# Patient Record
Sex: FEMALE | Race: WHITE | NOT HISPANIC OR LATINO | Employment: OTHER | URBAN - METROPOLITAN AREA
[De-identification: names, ages, dates, MRNs, and addresses within clinical notes are randomized per-mention and may not be internally consistent; named-entity substitution may affect disease eponyms.]

---

## 2024-11-21 ENCOUNTER — APPOINTMENT (OUTPATIENT)
Dept: CARDIOLOGY | Facility: HOSPITAL | Age: 69
End: 2024-11-21
Payer: MEDICARE

## 2024-11-21 ENCOUNTER — APPOINTMENT (OUTPATIENT)
Dept: RADIOLOGY | Facility: HOSPITAL | Age: 69
End: 2024-11-21
Payer: MEDICARE

## 2024-11-21 ENCOUNTER — HOSPITAL ENCOUNTER (INPATIENT)
Facility: HOSPITAL | Age: 69
End: 2024-11-21
Attending: EMERGENCY MEDICINE | Admitting: STUDENT IN AN ORGANIZED HEALTH CARE EDUCATION/TRAINING PROGRAM
Payer: MEDICARE

## 2024-11-21 DIAGNOSIS — I27.20 PULMONARY HYPERTENSION (MULTI): ICD-10-CM

## 2024-11-21 DIAGNOSIS — I51.81 TAKOTSUBO SYNDROME: ICD-10-CM

## 2024-11-21 DIAGNOSIS — L03.115 BILATERAL LOWER LEG CELLULITIS: ICD-10-CM

## 2024-11-21 DIAGNOSIS — R79.89 ELEVATED TROPONIN: ICD-10-CM

## 2024-11-21 DIAGNOSIS — L03.116 BILATERAL LOWER LEG CELLULITIS: ICD-10-CM

## 2024-11-21 DIAGNOSIS — R60.0 BILATERAL LOWER EXTREMITY EDEMA: ICD-10-CM

## 2024-11-21 DIAGNOSIS — I21.4 NON-ST ELEVATION (NSTEMI) MYOCARDIAL INFARCTION (MULTI): ICD-10-CM

## 2024-11-21 DIAGNOSIS — I50.9 NEW ONSET OF CONGESTIVE HEART FAILURE: Primary | ICD-10-CM

## 2024-11-21 DIAGNOSIS — I25.10 CAD (CORONARY ARTERY DISEASE): ICD-10-CM

## 2024-11-21 LAB
ALBUMIN SERPL BCP-MCNC: 3.4 G/DL (ref 3.4–5)
ALP SERPL-CCNC: 51 U/L (ref 33–136)
ALT SERPL W P-5'-P-CCNC: 10 U/L (ref 7–45)
ANION GAP SERPL CALC-SCNC: 9 MMOL/L (ref 10–20)
AST SERPL W P-5'-P-CCNC: 11 U/L (ref 9–39)
BASOPHILS # BLD AUTO: 0.05 X10*3/UL (ref 0–0.1)
BASOPHILS NFR BLD AUTO: 1 %
BILIRUB SERPL-MCNC: 0.6 MG/DL (ref 0–1.2)
BNP SERPL-MCNC: 1146 PG/ML (ref 0–99)
BUN SERPL-MCNC: 16 MG/DL (ref 6–23)
CALCIUM SERPL-MCNC: 9 MG/DL (ref 8.6–10.3)
CARDIAC TROPONIN I PNL SERPL HS: 415 NG/L (ref 0–13)
CARDIAC TROPONIN I PNL SERPL HS: 64 NG/L (ref 0–13)
CHLORIDE SERPL-SCNC: 100 MMOL/L (ref 98–107)
CO2 SERPL-SCNC: 35 MMOL/L (ref 21–32)
CREAT SERPL-MCNC: 0.91 MG/DL (ref 0.5–1.05)
EGFRCR SERPLBLD CKD-EPI 2021: 68 ML/MIN/1.73M*2
EOSINOPHIL # BLD AUTO: 0.05 X10*3/UL (ref 0–0.7)
EOSINOPHIL NFR BLD AUTO: 1 %
ERYTHROCYTE [DISTWIDTH] IN BLOOD BY AUTOMATED COUNT: 14 % (ref 11.5–14.5)
GLUCOSE SERPL-MCNC: 101 MG/DL (ref 74–99)
HCT VFR BLD AUTO: 49.3 % (ref 36–46)
HGB BLD-MCNC: 16 G/DL (ref 12–16)
IMM GRANULOCYTES # BLD AUTO: 0.01 X10*3/UL (ref 0–0.7)
IMM GRANULOCYTES NFR BLD AUTO: 0.2 % (ref 0–0.9)
INR PPP: 1.2 (ref 0.9–1.1)
LYMPHOCYTES # BLD AUTO: 0.79 X10*3/UL (ref 1.2–4.8)
LYMPHOCYTES NFR BLD AUTO: 15.8 %
MAGNESIUM SERPL-MCNC: 1.89 MG/DL (ref 1.6–2.4)
MCH RBC QN AUTO: 30.7 PG (ref 26–34)
MCHC RBC AUTO-ENTMCNC: 32.5 G/DL (ref 32–36)
MCV RBC AUTO: 94 FL (ref 80–100)
MONOCYTES # BLD AUTO: 0.46 X10*3/UL (ref 0.1–1)
MONOCYTES NFR BLD AUTO: 9.2 %
NEUTROPHILS # BLD AUTO: 3.65 X10*3/UL (ref 1.2–7.7)
NEUTROPHILS NFR BLD AUTO: 72.8 %
NRBC BLD-RTO: 0 /100 WBCS (ref 0–0)
PLATELET # BLD AUTO: 186 X10*3/UL (ref 150–450)
POTASSIUM SERPL-SCNC: 4.8 MMOL/L (ref 3.5–5.3)
PROT SERPL-MCNC: 6.4 G/DL (ref 6.4–8.2)
PROTHROMBIN TIME: 13.4 SECONDS (ref 9.8–12.8)
RBC # BLD AUTO: 5.22 X10*6/UL (ref 4–5.2)
SODIUM SERPL-SCNC: 139 MMOL/L (ref 136–145)
WBC # BLD AUTO: 5 X10*3/UL (ref 4.4–11.3)

## 2024-11-21 PROCEDURE — 2500000004 HC RX 250 GENERAL PHARMACY W/ HCPCS (ALT 636 FOR OP/ED): Performed by: NURSE PRACTITIONER

## 2024-11-21 PROCEDURE — 83880 ASSAY OF NATRIURETIC PEPTIDE: CPT | Performed by: NURSE PRACTITIONER

## 2024-11-21 PROCEDURE — 99223 1ST HOSP IP/OBS HIGH 75: CPT | Performed by: STUDENT IN AN ORGANIZED HEALTH CARE EDUCATION/TRAINING PROGRAM

## 2024-11-21 PROCEDURE — 84484 ASSAY OF TROPONIN QUANT: CPT | Performed by: NURSE PRACTITIONER

## 2024-11-21 PROCEDURE — 84075 ASSAY ALKALINE PHOSPHATASE: CPT | Performed by: NURSE PRACTITIONER

## 2024-11-21 PROCEDURE — 71045 X-RAY EXAM CHEST 1 VIEW: CPT | Mod: FOREIGN READ | Performed by: RADIOLOGY

## 2024-11-21 PROCEDURE — 94640 AIRWAY INHALATION TREATMENT: CPT

## 2024-11-21 PROCEDURE — 83735 ASSAY OF MAGNESIUM: CPT | Performed by: NURSE PRACTITIONER

## 2024-11-21 PROCEDURE — 96374 THER/PROPH/DIAG INJ IV PUSH: CPT

## 2024-11-21 PROCEDURE — 99285 EMERGENCY DEPT VISIT HI MDM: CPT

## 2024-11-21 PROCEDURE — 1200000002 HC GENERAL ROOM WITH TELEMETRY DAILY

## 2024-11-21 PROCEDURE — 84484 ASSAY OF TROPONIN QUANT: CPT | Performed by: STUDENT IN AN ORGANIZED HEALTH CARE EDUCATION/TRAINING PROGRAM

## 2024-11-21 PROCEDURE — 85610 PROTHROMBIN TIME: CPT | Performed by: NURSE PRACTITIONER

## 2024-11-21 PROCEDURE — 2500000005 HC RX 250 GENERAL PHARMACY W/O HCPCS: Performed by: EMERGENCY MEDICINE

## 2024-11-21 PROCEDURE — 2500000001 HC RX 250 WO HCPCS SELF ADMINISTERED DRUGS (ALT 637 FOR MEDICARE OP): Performed by: NURSE PRACTITIONER

## 2024-11-21 PROCEDURE — 93005 ELECTROCARDIOGRAM TRACING: CPT

## 2024-11-21 PROCEDURE — 71045 X-RAY EXAM CHEST 1 VIEW: CPT

## 2024-11-21 PROCEDURE — 2500000004 HC RX 250 GENERAL PHARMACY W/ HCPCS (ALT 636 FOR OP/ED)

## 2024-11-21 PROCEDURE — 80202 ASSAY OF VANCOMYCIN: CPT

## 2024-11-21 PROCEDURE — 85025 COMPLETE CBC W/AUTO DIFF WBC: CPT | Performed by: NURSE PRACTITIONER

## 2024-11-21 PROCEDURE — 36415 COLL VENOUS BLD VENIPUNCTURE: CPT | Performed by: STUDENT IN AN ORGANIZED HEALTH CARE EDUCATION/TRAINING PROGRAM

## 2024-11-21 PROCEDURE — 2500000002 HC RX 250 W HCPCS SELF ADMINISTERED DRUGS (ALT 637 FOR MEDICARE OP, ALT 636 FOR OP/ED): Performed by: NURSE PRACTITIONER

## 2024-11-21 PROCEDURE — 36415 COLL VENOUS BLD VENIPUNCTURE: CPT | Performed by: NURSE PRACTITIONER

## 2024-11-21 RX ORDER — ACETAMINOPHEN 325 MG/1
650 TABLET ORAL EVERY 4 HOURS PRN
Status: DISCONTINUED | OUTPATIENT
Start: 2024-11-21 | End: 2024-11-26 | Stop reason: HOSPADM

## 2024-11-21 RX ORDER — NAPROXEN SODIUM 220 MG/1
324 TABLET, FILM COATED ORAL ONCE
Status: COMPLETED | OUTPATIENT
Start: 2024-11-21 | End: 2024-11-21

## 2024-11-21 RX ORDER — IPRATROPIUM BROMIDE AND ALBUTEROL SULFATE 2.5; .5 MG/3ML; MG/3ML
3 SOLUTION RESPIRATORY (INHALATION) ONCE
Status: COMPLETED | OUTPATIENT
Start: 2024-11-21 | End: 2024-11-21

## 2024-11-21 RX ORDER — ENOXAPARIN SODIUM 100 MG/ML
40 INJECTION SUBCUTANEOUS EVERY 24 HOURS
Status: DISCONTINUED | OUTPATIENT
Start: 2024-11-21 | End: 2024-11-26 | Stop reason: HOSPADM

## 2024-11-21 RX ORDER — FUROSEMIDE 10 MG/ML
40 INJECTION INTRAMUSCULAR; INTRAVENOUS
Status: DISCONTINUED | OUTPATIENT
Start: 2024-11-22 | End: 2024-11-23

## 2024-11-21 RX ORDER — ACETAMINOPHEN 650 MG/1
650 SUPPOSITORY RECTAL EVERY 4 HOURS PRN
Status: DISCONTINUED | OUTPATIENT
Start: 2024-11-21 | End: 2024-11-26 | Stop reason: HOSPADM

## 2024-11-21 RX ORDER — VANCOMYCIN HYDROCHLORIDE 1 G/20ML
INJECTION, POWDER, LYOPHILIZED, FOR SOLUTION INTRAVENOUS DAILY PRN
Status: DISCONTINUED | OUTPATIENT
Start: 2024-11-21 | End: 2024-11-25

## 2024-11-21 RX ORDER — ACETAMINOPHEN 160 MG/5ML
650 SOLUTION ORAL EVERY 4 HOURS PRN
Status: DISCONTINUED | OUTPATIENT
Start: 2024-11-21 | End: 2024-11-26 | Stop reason: HOSPADM

## 2024-11-21 RX ORDER — POLYETHYLENE GLYCOL 3350 17 G/17G
17 POWDER, FOR SOLUTION ORAL DAILY PRN
Status: DISCONTINUED | OUTPATIENT
Start: 2024-11-21 | End: 2024-11-26 | Stop reason: HOSPADM

## 2024-11-21 RX ORDER — FUROSEMIDE 10 MG/ML
40 INJECTION INTRAMUSCULAR; INTRAVENOUS ONCE
Status: COMPLETED | OUTPATIENT
Start: 2024-11-21 | End: 2024-11-21

## 2024-11-21 RX ORDER — VANCOMYCIN HYDROCHLORIDE 750 MG/150ML
750 INJECTION, SOLUTION INTRAVENOUS EVERY 12 HOURS
Status: DISCONTINUED | OUTPATIENT
Start: 2024-11-21 | End: 2024-11-22

## 2024-11-21 SDOH — SOCIAL STABILITY: SOCIAL INSECURITY: DO YOU FEEL ANYONE HAS EXPLOITED OR TAKEN ADVANTAGE OF YOU FINANCIALLY OR OF YOUR PERSONAL PROPERTY?: NO

## 2024-11-21 SDOH — SOCIAL STABILITY: SOCIAL INSECURITY: ABUSE: ADULT

## 2024-11-21 SDOH — SOCIAL STABILITY: SOCIAL INSECURITY: ARE YOU OR HAVE YOU BEEN THREATENED OR ABUSED PHYSICALLY, EMOTIONALLY, OR SEXUALLY BY ANYONE?: NO

## 2024-11-21 SDOH — SOCIAL STABILITY: SOCIAL INSECURITY: ARE THERE ANY APPARENT SIGNS OF INJURIES/BEHAVIORS THAT COULD BE RELATED TO ABUSE/NEGLECT?: NO

## 2024-11-21 SDOH — SOCIAL STABILITY: SOCIAL INSECURITY: WERE YOU ABLE TO COMPLETE ALL THE BEHAVIORAL HEALTH SCREENINGS?: YES

## 2024-11-21 SDOH — SOCIAL STABILITY: SOCIAL INSECURITY: DOES ANYONE TRY TO KEEP YOU FROM HAVING/CONTACTING OTHER FRIENDS OR DOING THINGS OUTSIDE YOUR HOME?: NO

## 2024-11-21 SDOH — SOCIAL STABILITY: SOCIAL INSECURITY: HAVE YOU HAD THOUGHTS OF HARMING ANYONE ELSE?: NO

## 2024-11-21 SDOH — SOCIAL STABILITY: SOCIAL INSECURITY: HAVE YOU HAD ANY THOUGHTS OF HARMING ANYONE ELSE?: YES

## 2024-11-21 SDOH — SOCIAL STABILITY: SOCIAL INSECURITY: HAS ANYONE EVER THREATENED TO HURT YOUR FAMILY OR YOUR PETS?: NO

## 2024-11-21 SDOH — SOCIAL STABILITY: SOCIAL INSECURITY: DO YOU FEEL UNSAFE GOING BACK TO THE PLACE WHERE YOU ARE LIVING?: NO

## 2024-11-21 ASSESSMENT — ACTIVITIES OF DAILY LIVING (ADL)
FEEDING YOURSELF: INDEPENDENT
HEARING - LEFT EAR: FUNCTIONAL
HEARING - RIGHT EAR: FUNCTIONAL
BATHING: INDEPENDENT
WALKS IN HOME: INDEPENDENT
PATIENT'S MEMORY ADEQUATE TO SAFELY COMPLETE DAILY ACTIVITIES?: YES
TOILETING: INDEPENDENT
JUDGMENT_ADEQUATE_SAFELY_COMPLETE_DAILY_ACTIVITIES: YES
GROOMING: INDEPENDENT
ADEQUATE_TO_COMPLETE_ADL: YES
LACK_OF_TRANSPORTATION: NO
DRESSING YOURSELF: INDEPENDENT

## 2024-11-21 ASSESSMENT — COGNITIVE AND FUNCTIONAL STATUS - GENERAL
PATIENT BASELINE BEDBOUND: NO
DAILY ACTIVITIY SCORE: 24
MOBILITY SCORE: 24

## 2024-11-21 ASSESSMENT — PATIENT HEALTH QUESTIONNAIRE - PHQ9
SUM OF ALL RESPONSES TO PHQ9 QUESTIONS 1 & 2: 0
1. LITTLE INTEREST OR PLEASURE IN DOING THINGS: NOT AT ALL
2. FEELING DOWN, DEPRESSED OR HOPELESS: NOT AT ALL

## 2024-11-21 ASSESSMENT — COLUMBIA-SUICIDE SEVERITY RATING SCALE - C-SSRS
2. HAVE YOU ACTUALLY HAD ANY THOUGHTS OF KILLING YOURSELF?: NO
1. IN THE PAST MONTH, HAVE YOU WISHED YOU WERE DEAD OR WISHED YOU COULD GO TO SLEEP AND NOT WAKE UP?: NO
6. HAVE YOU EVER DONE ANYTHING, STARTED TO DO ANYTHING, OR PREPARED TO DO ANYTHING TO END YOUR LIFE?: NO

## 2024-11-21 ASSESSMENT — LIFESTYLE VARIABLES
AUDIT-C TOTAL SCORE: 1
HOW OFTEN DO YOU HAVE A DRINK CONTAINING ALCOHOL: NEVER
HOW OFTEN DO YOU HAVE 6 OR MORE DRINKS ON ONE OCCASION: LESS THAN MONTHLY
AUDIT-C TOTAL SCORE: 1
HOW MANY STANDARD DRINKS CONTAINING ALCOHOL DO YOU HAVE ON A TYPICAL DAY: 1 OR 2
SKIP TO QUESTIONS 9-10: 0

## 2024-11-21 ASSESSMENT — PAIN - FUNCTIONAL ASSESSMENT: PAIN_FUNCTIONAL_ASSESSMENT: 0-10

## 2024-11-21 ASSESSMENT — PAIN SCALES - GENERAL
PAINLEVEL_OUTOF10: 0 - NO PAIN
PAINLEVEL_OUTOF10: 0 - NO PAIN

## 2024-11-21 NOTE — ED PROVIDER NOTES
HPI   Chief Complaint   Patient presents with    Leg Swelling     Swelling to BLE X2 weeks       69-year-old female presents emergency department, states she is noticed swelling to her legs over the last 2 weeks, states it is getting significantly worse each day.  Patient states she sleeps in a recliner every night anyway, states when she elevates her legs in the recliner her feet and leg swelling improved but notices that her swelling travels to her abdomen and rear ends.  Patient states throughout the day her legs swell worse.  She notes that she is also short of breath.  Patient states she has been prescribed diuretics in the past, states she took a couple doses which did seem to help her produce urine but still continues to be quite swollen.  Patient notes she has not seen a doctor in about 3 years.  She is a smoker as well but due to her shortness of breath has not smoked in about 3 days.      History provided by:  Patient   used: No            Patient History   Past Medical History:   Diagnosis Date    Personal history of pneumonia (recurrent)     History of pneumonia     Past Surgical History:   Procedure Laterality Date    CARDIAC CATHETERIZATION N/A 11/22/2024    Procedure: Left Heart Cath, With LV;  Surgeon: Wesley Lundberg MD;  Location: [facility];  Service: Cardiovascular;  Laterality: N/A;    OTHER SURGICAL HISTORY  03/12/2021    Knee arthroscopy    OTHER SURGICAL HISTORY  03/12/2021    Esophagogastroduodenoscopy     No family history on file.  Social History     Tobacco Use    Smoking status: Every Day     Types: Cigarettes    Smokeless tobacco: Current   Substance Use Topics    Alcohol use: Not on file    Drug use: Not on file       Physical Exam   ED Triage Vitals [11/21/24 1241]   Temperature Heart Rate Respirations BP   36.3 °C (97.3 °F) (!) 111 20 (!) 197/86      Pulse Ox Temp Source Heart Rate Source Patient Position   (!) 90 % Temporal Monitor Sitting      BP  Location FiO2 (%)     Right arm --       Physical Exam  Constitutional: Vitals noted, no distress. Afebrile.   Cardiovascular: Regular, rate, rhythm, no murmur.   Pulmonary: Lungs with good aeration, although crackles are appreciated in bases bilaterally.  Gastrointestinal: Soft, nonsurgical. Nontender. No peritoneal signs. Normoactive bowel sounds.   Musculoskeletal: Bilateral lower extremities with 3+ pitting edema  Skin: No rash.   Neuro: No focal neurologic deficits, NIH score of 0.   ED Course & MDM   Diagnoses as of 11/23/24 0618   New onset of congestive heart failure     Labs Reviewed   CBC WITH AUTO DIFFERENTIAL - Abnormal       Result Value    WBC 5.0      nRBC 0.0      RBC 5.22 (*)     Hemoglobin 16.0      Hematocrit 49.3 (*)     MCV 94      MCH 30.7      MCHC 32.5      RDW 14.0      Platelets 186      Neutrophils % 72.8      Immature Granulocytes %, Automated 0.2      Lymphocytes % 15.8      Monocytes % 9.2      Eosinophils % 1.0      Basophils % 1.0      Neutrophils Absolute 3.65      Immature Granulocytes Absolute, Automated 0.01      Lymphocytes Absolute 0.79 (*)     Monocytes Absolute 0.46      Eosinophils Absolute 0.05      Basophils Absolute 0.05     COMPREHENSIVE METABOLIC PANEL - Abnormal    Glucose 101 (*)     Sodium 139      Potassium 4.8      Chloride 100      Bicarbonate 35 (*)     Anion Gap 9 (*)     Urea Nitrogen 16      Creatinine 0.91      eGFR 68      Calcium 9.0      Albumin 3.4      Alkaline Phosphatase 51      Total Protein 6.4      AST 11      Bilirubin, Total 0.6      ALT 10     PROTIME-INR - Abnormal    Protime 13.4 (*)     INR 1.2 (*)    TROPONIN I, HIGH SENSITIVITY - Abnormal    Troponin I, High Sensitivity 64 (*)     Narrative:     Less than 99th percentile of normal range cutoff-  Female and children under 18 years old <14 ng/L; Male <21 ng/L: Negative  Repeat testing should be performed if clinically indicated.     Female and children under 18 years old 14-50 ng/L; Male 21-50  ng/L:  Consistent with possible cardiac damage and possible increased clinical   risk. Serial measurements may help to assess extent of myocardial damage.     >50 ng/L: Consistent with cardiac damage, increased clinical risk and  myocardial infarction. Serial measurements may help assess extent of   myocardial damage.      NOTE: Children less than 1 year old may have higher baseline troponin   levels and results should be interpreted in conjunction with the overall   clinical context.     NOTE: Troponin I testing is performed using a different   testing methodology at [facility] than at other   [facility]. Direct result comparisons should only   be made within the same method.   B-TYPE NATRIURETIC PEPTIDE - Abnormal    BNP 1,146 (*)     Narrative:        <100 pg/mL - Heart failure unlikely  100-299 pg/mL - Intermediate probability of acute heart                  failure exacerbation. Correlate with clinical                  context and patient history.    >=300 pg/mL - Heart Failure likely. Correlate with clinical                  context and patient history.    BNP testing is performed using different testing methodology at [facility] than at other [facility]. Direct result comparisons should only be made within the same method.      TROPONIN I, HIGH SENSITIVITY - Abnormal    Troponin I, High Sensitivity 415 (*)     Narrative:     Less than 99th percentile of normal range cutoff-  Female and children under 18 years old <14 ng/L; Male <21 ng/L: Negative  Repeat testing should be performed if clinically indicated.     Female and children under 18 years old 14-50 ng/L; Male 21-50 ng/L:  Consistent with possible cardiac damage and possible increased clinical   risk. Serial measurements may help to assess extent of myocardial damage.     >50 ng/L: Consistent with cardiac damage, increased clinical risk and  myocardial infarction. Serial measurements may help assess extent of    myocardial damage.      NOTE: Children less than 1 year old may have higher baseline troponin   levels and results should be interpreted in conjunction with the overall   clinical context.     NOTE: Troponin I testing is performed using a different   testing methodology at [facility] than at other   [facility] hospitals. Direct result comparisons should only   be made within the same method.   BASIC METABOLIC PANEL - Abnormal    Glucose 100 (*)     Sodium 138      Potassium 4.4      Chloride 101      Bicarbonate 30      Anion Gap 11      Urea Nitrogen 18      Creatinine 0.96      eGFR 64      Calcium 8.8     CBC WITH AUTO DIFFERENTIAL - Abnormal    WBC 6.5      nRBC 0.0      RBC 5.15      Hemoglobin 15.6      Hematocrit 48.5 (*)     MCV 94      MCH 30.3      MCHC 32.2      RDW 14.0      Platelets 192      Neutrophils % 76.4      Immature Granulocytes %, Automated 0.2      Lymphocytes % 13.4      Monocytes % 8.6      Eosinophils % 0.9      Basophils % 0.5      Neutrophils Absolute 4.97      Immature Granulocytes Absolute, Automated 0.01      Lymphocytes Absolute 0.87 (*)     Monocytes Absolute 0.56      Eosinophils Absolute 0.06      Basophils Absolute 0.03     TROPONIN I, HIGH SENSITIVITY - Abnormal    Troponin I, High Sensitivity 749 (*)     Narrative:     Less than 99th percentile of normal range cutoff-  Female and children under 18 years old <14 ng/L; Male <21 ng/L: Negative  Repeat testing should be performed if clinically indicated.     Female and children under 18 years old 14-50 ng/L; Male 21-50 ng/L:  Consistent with possible cardiac damage and possible increased clinical   risk. Serial measurements may help to assess extent of myocardial damage.     >50 ng/L: Consistent with cardiac damage, increased clinical risk and  myocardial infarction. Serial measurements may help assess extent of   myocardial damage.      NOTE: Children less than 1 year old may have higher baseline troponin   levels and  results should be interpreted in conjunction with the overall   clinical context.     NOTE: Troponin I testing is performed using a different   testing methodology at [facility] than at other   [facility]. Direct result comparisons should only   be made within the same method.   D-DIMER, NON VTE - Abnormal    D-Dimer Non VTE, Quant (ng/mL FEU) 978 (*)     Narrative:     The D-Dimer assay is reported in ng/mL Fibrinogen Equivalent Units (FEU). The results of this assay should NOT be used for the exclusion of Deep Vein Thrombosis and/or Pulmonary Embolism.   MAGNESIUM - Normal    Magnesium 1.89     MAGNESIUM - Normal    Magnesium 1.75     VANCOMYCIN - Normal    Vancomycin 6.0      Narrative:     Vancomycin levels can be monitored according to area under the curve (AUC) or concentration (ug/mL). The preferred monitoring strategy is determined by the patient's renal function and indication for therapy.     For AUC monitoring, a random vancomycin level should be interpreted in the context of AUC rather than the concentration at a single point in time.    For concentration monitoring, a trough concentration drawn immediately prior to the next dose is preferred.       Therapeutic ranges using concentration-guided results:  Peak (all ages):                  30.0-40.0 ug/mL  Trough (all ages):                10.0-20.0 ug/mL              VANCOMYCIN - Normal    Vancomycin 19.9      Narrative:     Vancomycin levels can be monitored according to area under the curve (AUC) or concentration (ug/mL). The preferred monitoring strategy is determined by the patient's renal function and indication for therapy.     For AUC monitoring, a random vancomycin level should be interpreted in the context of AUC rather than the concentration at a single point in time.    For concentration monitoring, a trough concentration drawn immediately prior to the next dose is preferred.       Therapeutic ranges using  concentration-guided results:  Peak (all ages):                  30.0-40.0 ug/mL  Trough (all ages):                10.0-20.0 ug/mL              LIPID PANEL    Cholesterol 158      HDL-Cholesterol 56.0      Cholesterol/HDL Ratio 2.8      LDL Calculated 87      VLDL 15      Triglycerides 75      Non HDL Cholesterol 102     BASIC METABOLIC PANEL   CBC WITH AUTO DIFFERENTIAL   MAGNESIUM   VANCOMYCIN        Cardiac Catheterization Procedure   Final Result      XR chest 1 view   Final Result   Cardiomegaly with pulmonary vascular congestion and small right   pleural effusion.   Patchy right basilar infiltrate or atelectasis.   Signed by Elio Dupree MD      Transthoracic Echo (TTE) Complete    (Results Pending)                 No data recorded     Renetta Coma Scale Score: 15 (11/22/24 1948 : Jen Courtney RN)                   Medical Decision Making  Patient presents with complaints of leg swelling, physical exam is concerning for fluid overload.  Patient was she has not seen a doctor in over 3 years.    Initial SpO2 90%, she is also tachycardic and hypertensive.  Placed on 2 L of O2 via nasal cannula    EKG at 1450 with ventricular of 99, as read by me, shows normal sinus rhythm with normal axis unremarkable, unremarkable ST and T wave patterns, no evidence of acute ischemia or other acute findings.    CBC unremarkable, metabolic panel unremarkable as well.  Patient's BNP elevated to 1100, troponin 64.  Chest x-ray, as interpreted by me, consistent with pulmonary edema.    Patient will be treated for heart failure, given 40 mg of IV Lasix and 324 of aspirin.  Given DuoNeb treatment as well.    Patient has not seen a primary doctor in many years, will be treated as new onset heart failure.  Discussed with Dr. Chisholm, admitted to his service.      Shared LEIF Attestation:    This patient was seen by the advanced practice provider.  I personally saw the patient and made/approved the management plan and take  responsibility for the patient management.    History: 69-year-old female presents with bilateral leg swelling.    Exam: Tachycardic, regular rate and rhythm cardiac exam with bibasilar rales on lung exam.  Abdomen is soft and nontender.  There is 3+ pitting edema to the bilateral lower extremities.  Neurological exam is grossly intact.    MDM: CHF, acute kidney injury, peripheral edema    My interpretation of EKG is normal sinus rhythm at 99 bpm with nonspecific ST-T changes    Labs Reviewed   CBC WITH AUTO DIFFERENTIAL - Abnormal       Result Value    WBC 5.0      nRBC 0.0      RBC 5.22 (*)     Hemoglobin 16.0      Hematocrit 49.3 (*)     MCV 94      MCH 30.7      MCHC 32.5      RDW 14.0      Platelets 186      Neutrophils % 72.8      Immature Granulocytes %, Automated 0.2      Lymphocytes % 15.8      Monocytes % 9.2      Eosinophils % 1.0      Basophils % 1.0      Neutrophils Absolute 3.65      Immature Granulocytes Absolute, Automated 0.01      Lymphocytes Absolute 0.79 (*)     Monocytes Absolute 0.46      Eosinophils Absolute 0.05      Basophils Absolute 0.05     COMPREHENSIVE METABOLIC PANEL - Abnormal    Glucose 101 (*)     Sodium 139      Potassium 4.8      Chloride 100      Bicarbonate 35 (*)     Anion Gap 9 (*)     Urea Nitrogen 16      Creatinine 0.91      eGFR 68      Calcium 9.0      Albumin 3.4      Alkaline Phosphatase 51      Total Protein 6.4      AST 11      Bilirubin, Total 0.6      ALT 10     PROTIME-INR - Abnormal    Protime 13.4 (*)     INR 1.2 (*)    TROPONIN I, HIGH SENSITIVITY - Abnormal    Troponin I, High Sensitivity 64 (*)     Narrative:     Less than 99th percentile of normal range cutoff-  Female and children under 18 years old <14 ng/L; Male <21 ng/L: Negative  Repeat testing should be performed if clinically indicated.     Female and children under 18 years old 14-50 ng/L; Male 21-50 ng/L:  Consistent with possible cardiac damage and possible increased clinical   risk. Serial  measurements may help to assess extent of myocardial damage.     >50 ng/L: Consistent with cardiac damage, increased clinical risk and  myocardial infarction. Serial measurements may help assess extent of   myocardial damage.      NOTE: Children less than 1 year old may have higher baseline troponin   levels and results should be interpreted in conjunction with the overall   clinical context.     NOTE: Troponin I testing is performed using a different   testing methodology at [facility] than at other   [facility]. Direct result comparisons should only   be made within the same method.   B-TYPE NATRIURETIC PEPTIDE - Abnormal    BNP 1,146 (*)     Narrative:        <100 pg/mL - Heart failure unlikely  100-299 pg/mL - Intermediate probability of acute heart                  failure exacerbation. Correlate with clinical                  context and patient history.    >=300 pg/mL - Heart Failure likely. Correlate with clinical                  context and patient history.    BNP testing is performed using different testing methodology at [facility] than at other [facility]. Direct result comparisons should only be made within the same method.      TROPONIN I, HIGH SENSITIVITY - Abnormal    Troponin I, High Sensitivity 415 (*)     Narrative:     Less than 99th percentile of normal range cutoff-  Female and children under 18 years old <14 ng/L; Male <21 ng/L: Negative  Repeat testing should be performed if clinically indicated.     Female and children under 18 years old 14-50 ng/L; Male 21-50 ng/L:  Consistent with possible cardiac damage and possible increased clinical   risk. Serial measurements may help to assess extent of myocardial damage.     >50 ng/L: Consistent with cardiac damage, increased clinical risk and  myocardial infarction. Serial measurements may help assess extent of   myocardial damage.      NOTE: Children less than 1 year old may have higher baseline troponin    levels and results should be interpreted in conjunction with the overall   clinical context.     NOTE: Troponin I testing is performed using a different   testing methodology at [facility] than at other   [facility] hospitals. Direct result comparisons should only   be made within the same method.   BASIC METABOLIC PANEL - Abnormal    Glucose 100 (*)     Sodium 138      Potassium 4.4      Chloride 101      Bicarbonate 30      Anion Gap 11      Urea Nitrogen 18      Creatinine 0.96      eGFR 64      Calcium 8.8     CBC WITH AUTO DIFFERENTIAL - Abnormal    WBC 6.5      nRBC 0.0      RBC 5.15      Hemoglobin 15.6      Hematocrit 48.5 (*)     MCV 94      MCH 30.3      MCHC 32.2      RDW 14.0      Platelets 192      Neutrophils % 76.4      Immature Granulocytes %, Automated 0.2      Lymphocytes % 13.4      Monocytes % 8.6      Eosinophils % 0.9      Basophils % 0.5      Neutrophils Absolute 4.97      Immature Granulocytes Absolute, Automated 0.01      Lymphocytes Absolute 0.87 (*)     Monocytes Absolute 0.56      Eosinophils Absolute 0.06      Basophils Absolute 0.03     TROPONIN I, HIGH SENSITIVITY - Abnormal    Troponin I, High Sensitivity 749 (*)     Narrative:     Less than 99th percentile of normal range cutoff-  Female and children under 18 years old <14 ng/L; Male <21 ng/L: Negative  Repeat testing should be performed if clinically indicated.     Female and children under 18 years old 14-50 ng/L; Male 21-50 ng/L:  Consistent with possible cardiac damage and possible increased clinical   risk. Serial measurements may help to assess extent of myocardial damage.     >50 ng/L: Consistent with cardiac damage, increased clinical risk and  myocardial infarction. Serial measurements may help assess extent of   myocardial damage.      NOTE: Children less than 1 year old may have higher baseline troponin   levels and results should be interpreted in conjunction with the overall   clinical context.     NOTE:  Troponin I testing is performed using a different   testing methodology at [facility] than at other   [facility]. Direct result comparisons should only   be made within the same method.   D-DIMER, NON VTE - Abnormal    D-Dimer Non VTE, Quant (ng/mL FEU) 978 (*)     Narrative:     The D-Dimer assay is reported in ng/mL Fibrinogen Equivalent Units (FEU). The results of this assay should NOT be used for the exclusion of Deep Vein Thrombosis and/or Pulmonary Embolism.   MAGNESIUM - Normal    Magnesium 1.89     MAGNESIUM - Normal    Magnesium 1.75     VANCOMYCIN - Normal    Vancomycin 6.0      Narrative:     Vancomycin levels can be monitored according to area under the curve (AUC) or concentration (ug/mL). The preferred monitoring strategy is determined by the patient's renal function and indication for therapy.     For AUC monitoring, a random vancomycin level should be interpreted in the context of AUC rather than the concentration at a single point in time.    For concentration monitoring, a trough concentration drawn immediately prior to the next dose is preferred.       Therapeutic ranges using concentration-guided results:  Peak (all ages):                  30.0-40.0 ug/mL  Trough (all ages):                10.0-20.0 ug/mL              VANCOMYCIN - Normal    Vancomycin 19.9      Narrative:     Vancomycin levels can be monitored according to area under the curve (AUC) or concentration (ug/mL). The preferred monitoring strategy is determined by the patient's renal function and indication for therapy.     For AUC monitoring, a random vancomycin level should be interpreted in the context of AUC rather than the concentration at a single point in time.    For concentration monitoring, a trough concentration drawn immediately prior to the next dose is preferred.       Therapeutic ranges using concentration-guided results:  Peak (all ages):                  30.0-40.0 ug/mL  Trough (all ages):                 10.0-20.0 ug/mL              LIPID PANEL    Cholesterol 158      HDL-Cholesterol 56.0      Cholesterol/HDL Ratio 2.8      LDL Calculated 87      VLDL 15      Triglycerides 75      Non HDL Cholesterol 102     BASIC METABOLIC PANEL   CBC WITH AUTO DIFFERENTIAL   MAGNESIUM   VANCOMYCIN       Cardiac Catheterization Procedure   Final Result      XR chest 1 view   Final Result   Cardiomegaly with pulmonary vascular congestion and small right   pleural effusion.   Patchy right basilar infiltrate or atelectasis.   Signed by Elio Dupree MD      Transthoracic Echo (TTE) Complete    (Results Pending)         I have seen and examined the patient, agree with the workup, evaluation, medical decision making, management and diagnosis.  The care plan has been discussed.    Arturo Suarez MD      Procedure  Procedures     MARIO Martinez-MADHAVI  11/21/24 1523       MARIO Martinez-MADHAVI  11/23/24 0618

## 2024-11-21 NOTE — H&P
Medical Group History and Physical  ASSESSMENT & PLAN:     Acute on chronic diastolic CHF exacerbation  - Presenting from home with 2 to 3 weeks of lower extremity swelling and 1 day of shortness of breath  - BNP elevated to 1000's, troponin elevated as well  - Chest x-ray with concerns for pulmonary vascular congestion  - Previous echo in 2021 with diastolic dysfunction  - Repeat echocardiogram ordered  - Lasix 40 g IV twice daily  - Strict I's and O's, daily weights, 2 g sodium restriction diet  - Cardiology consulted  - Heart failure navigator    Elevated troponin  - Troponin initially 64, no repeat ordered by the ED  - Follow-up next repeat troponins and EKGs  - Cardiology consulted as per above  - Elevated troponin likely in setting of acute CHF as per above    Bilateral lower extremity cellulitis  - Significant erythema, warmth of the bilateral lower extremities  - Continue vancomycin at this time    VTE Prophylaxis: Enoxaparin subcutaneous      ---Of note, this documentation is completed using the Dragon Dictation system (voice recognition software). There may be spelling and/or grammatical errors that were not corrected prior to final submission.---    Arpan Chisholm MD    HISTORY OF PRESENT ILLNESS:   Chief Complaint: Lower extremity swelling, SOB    History Of Present Illness:    Sandee Sotelo is a 69 y.o. female that has not seen a physician in quite some time that presented from home with worsening lower extremity swelling and shortness of breath.  She states over the last 2 to 3 weeks has been having progressive lower extremity swelling up to her lower abdomen.  She states that today when she was walking to her car she developed sudden onset of dyspnea which did not improve.  Denies having any chest pain, fevers, chills.  States that she sleeps on a recliner and does not lay flat at baseline due to keeping her legs elevated.  Patient attributed the lower extremity erythema to her chronic swelling as  well.    Patient states that not seen a physician or PCP in many years.  Does not take any medications at this time as well.    ED course:  - Vitals: Temperature 97.3, , /86, RR 20 saturating 90% on room air which improved to 2 L nasal cannula at 96%  - Labs: Unremarkable CBC and CMP.  BNP of 1146 with a troponin of 64.  - Imaging: Chest x-ray with cardiomegaly and pulmonary vascular congestion with a small right pleural effusion.  Patchy right basilar infiltrate or atelectasis.     Review of systems: 10 point review of systems is otherwise negative except as mentioned above.    PAST HISTORIES:     Past Medical History: No significant past medical history, patient has not seen a physician in many years    Past Surgical History: Left knee arthroscopy      Social History: Current tobacco smoker at 5 cigarettes/day, unknown pack-year history.  Consumes alcohol socially.  Denies illicit drug use.  Lives at home with her brother.  Independent with ADLs.    Family History: Unknown past medical history of patient's mother as she did not see a physician while she was alive.  Father  at age 52 from an allergic reaction to penicillin otherwise was healthy.  Brother with liver disease, previous alcoholic.     Allergies: Acetaminophen-codeine, Codeine, and Penicillins    OBJECTIVE:     Last Recorded Vitals:  Vitals:    24 1352 24 1428 24 1537 24 1540   BP: 170/78  146/80    BP Location:       Patient Position:       Pulse:  92 96 94   Resp:  19 20 (!) 24   Temp:       TempSrc:       SpO2:  97% 96% 96%   Weight:       Height:         Last I/O:  No intake/output data recorded.    Physical Exam  Vitals reviewed.   Constitutional:       General: She is not in acute distress.     Appearance: Normal appearance. She is not toxic-appearing.   HENT:      Head: Normocephalic and atraumatic.      Nose: Nose normal.      Mouth/Throat:      Mouth: Mucous membranes are moist.      Pharynx:  Oropharynx is clear.      Comments: Dentition okay, missing teeth.  Eyes:      Extraocular Movements: Extraocular movements intact.      Conjunctiva/sclera: Conjunctivae normal.      Pupils: Pupils are equal, round, and reactive to light.   Cardiovascular:      Rate and Rhythm: Normal rate and regular rhythm.      Pulses: Normal pulses.      Heart sounds: Normal heart sounds.   Pulmonary:      Effort: Pulmonary effort is normal. No respiratory distress.      Breath sounds: No wheezing.      Comments: Breath sounds diminished auscultation bilaterally however no crackles.  Abdominal:      General: Bowel sounds are normal. There is no distension.      Palpations: Abdomen is soft.      Tenderness: There is no abdominal tenderness. There is no guarding.   Musculoskeletal:         General: Swelling present. Normal range of motion.      Cervical back: Normal range of motion and neck supple.      Right lower leg: Edema present.      Left lower leg: Edema present.      Comments: Bilateral lower extremities with 3-4+ pitting edema.   Skin:     General: Skin is warm.      Findings: Erythema present.      Comments: Significant erythema and concerns for cellulitis in the bilateral lower extremities.   Neurological:      General: No focal deficit present.      Mental Status: She is alert and oriented to person, place, and time. Mental status is at baseline.   Psychiatric:         Mood and Affect: Mood normal.         Behavior: Behavior normal.         Thought Content: Thought content normal.         Scheduled Medications  oxygen, , inhalation, Continuous - Inhalation      PRN Medications    Continuous Medications       Outpatient Medications:  Prior to Admission medications    Not on File       LABS AND IMAGING:     Labs:  Results from last 7 days   Lab Units 11/21/24  1426   WBC AUTO x10*3/uL 5.0   RBC AUTO x10*6/uL 5.22*   HEMOGLOBIN g/dL 16.0   HEMATOCRIT % 49.3*   MCV fL 94   MCH pg 30.7   MCHC g/dL 32.5   RDW % 14.0    PLATELETS AUTO x10*3/uL 186     Results from last 7 days   Lab Units 11/21/24  1426   SODIUM mmol/L 139   POTASSIUM mmol/L 4.8   CHLORIDE mmol/L 100   CO2 mmol/L 35*   BUN mg/dL 16   CREATININE mg/dL 0.91   GLUCOSE mg/dL 101*   PROTEIN TOTAL g/dL 6.4   CALCIUM mg/dL 9.0   BILIRUBIN TOTAL mg/dL 0.6   ALK PHOS U/L 51   AST U/L 11   ALT U/L 10     Results from last 7 days   Lab Units 11/21/24  1426   MAGNESIUM mg/dL 1.89     Results from last 7 days   Lab Units 11/21/24  1426   TROPHS ng/L 64*       Imaging:  XR chest 1 view  Narrative: STUDY:  Chest Radiograph;  11/21/2024 14:49  INDICATION:  Shortness of breath.  COMPARISON:  1/15/2021 XR Chest  ACCESSION NUMBER(S):  [id number]  ORDERING CLINICIAN:  RAMIRO MERINO  TECHNIQUE:  Frontal chest was obtained at 14:48 hours.  FINDINGS:  CARDIOMEDIASTINAL SILHOUETTE:  Heart is mildly enlarged with pulmonary vascular congestion.     LUNGS:  Patchy right basilar infiltrate or atelectasis with small right  pleural effusion.     ABDOMEN:  No remarkable upper abdominal findings.     BONES:  No acute osseous changes.  Impression: Cardiomegaly with pulmonary vascular congestion and small right  pleural effusion.  Patchy right basilar infiltrate or atelectasis.  Signed by Elio Dupree MD  ECG 12 lead  Normal sinus rhythm  Possible Left atrial enlargement  Borderline ECG  When compared with ECG of 15-YOLIE-2021 12:44,  Premature ventricular complexes are no longer Present  Nonspecific T wave abnormality no longer evident in Inferior leads  Nonspecific T wave abnormality, improved in Anterior leads    See ED provider note for full interpretation and clinical correlation

## 2024-11-22 ENCOUNTER — APPOINTMENT (OUTPATIENT)
Dept: CARDIOLOGY | Facility: HOSPITAL | Age: 69
End: 2024-11-22
Payer: MEDICARE

## 2024-11-22 PROBLEM — R79.89 ELEVATED TROPONIN: Status: ACTIVE | Noted: 2024-11-21

## 2024-11-22 PROBLEM — R60.0 BILATERAL LOWER EXTREMITY EDEMA: Status: ACTIVE | Noted: 2024-11-21

## 2024-11-22 LAB
ANION GAP SERPL CALC-SCNC: 11 MMOL/L (ref 10–20)
ATRIAL RATE: 99 BPM
BASOPHILS # BLD AUTO: 0.03 X10*3/UL (ref 0–0.1)
BASOPHILS NFR BLD AUTO: 0.5 %
BUN SERPL-MCNC: 18 MG/DL (ref 6–23)
CALCIUM SERPL-MCNC: 8.8 MG/DL (ref 8.6–10.3)
CARDIAC TROPONIN I PNL SERPL HS: 749 NG/L (ref 0–13)
CHLORIDE SERPL-SCNC: 101 MMOL/L (ref 98–107)
CHOLEST SERPL-MCNC: 158 MG/DL (ref 0–199)
CHOLESTEROL/HDL RATIO: 2.8
CO2 SERPL-SCNC: 30 MMOL/L (ref 21–32)
CREAT SERPL-MCNC: 0.96 MG/DL (ref 0.5–1.05)
D DIMER PPP FEU-MCNC: 978 NG/ML FEU
EGFRCR SERPLBLD CKD-EPI 2021: 64 ML/MIN/1.73M*2
EOSINOPHIL # BLD AUTO: 0.06 X10*3/UL (ref 0–0.7)
EOSINOPHIL NFR BLD AUTO: 0.9 %
ERYTHROCYTE [DISTWIDTH] IN BLOOD BY AUTOMATED COUNT: 14 % (ref 11.5–14.5)
GLUCOSE SERPL-MCNC: 100 MG/DL (ref 74–99)
HCT VFR BLD AUTO: 48.5 % (ref 36–46)
HDLC SERPL-MCNC: 56 MG/DL
HGB BLD-MCNC: 15.6 G/DL (ref 12–16)
HOLD SPECIMEN: NORMAL
HOLD SPECIMEN: NORMAL
IMM GRANULOCYTES # BLD AUTO: 0.01 X10*3/UL (ref 0–0.7)
IMM GRANULOCYTES NFR BLD AUTO: 0.2 % (ref 0–0.9)
LDLC SERPL CALC-MCNC: 87 MG/DL
LYMPHOCYTES # BLD AUTO: 0.87 X10*3/UL (ref 1.2–4.8)
LYMPHOCYTES NFR BLD AUTO: 13.4 %
MAGNESIUM SERPL-MCNC: 1.75 MG/DL (ref 1.6–2.4)
MCH RBC QN AUTO: 30.3 PG (ref 26–34)
MCHC RBC AUTO-ENTMCNC: 32.2 G/DL (ref 32–36)
MCV RBC AUTO: 94 FL (ref 80–100)
MONOCYTES # BLD AUTO: 0.56 X10*3/UL (ref 0.1–1)
MONOCYTES NFR BLD AUTO: 8.6 %
NEUTROPHILS # BLD AUTO: 4.97 X10*3/UL (ref 1.2–7.7)
NEUTROPHILS NFR BLD AUTO: 76.4 %
NON HDL CHOLESTEROL: 102 MG/DL (ref 0–149)
NRBC BLD-RTO: 0 /100 WBCS (ref 0–0)
P AXIS: 69 DEGREES
P OFFSET: 186 MS
P ONSET: 141 MS
PLATELET # BLD AUTO: 192 X10*3/UL (ref 150–450)
POTASSIUM SERPL-SCNC: 4.4 MMOL/L (ref 3.5–5.3)
PR INTERVAL: 160 MS
Q ONSET: 221 MS
QRS COUNT: 16 BEATS
QRS DURATION: 74 MS
QT INTERVAL: 342 MS
QTC CALCULATION(BAZETT): 438 MS
QTC FREDERICIA: 404 MS
R AXIS: 59 DEGREES
RBC # BLD AUTO: 5.15 X10*6/UL (ref 4–5.2)
SODIUM SERPL-SCNC: 138 MMOL/L (ref 136–145)
T AXIS: 41 DEGREES
T OFFSET: 392 MS
TRIGL SERPL-MCNC: 75 MG/DL (ref 0–149)
VANCOMYCIN SERPL-MCNC: 19.9 UG/ML (ref 5–20)
VANCOMYCIN SERPL-MCNC: 6 UG/ML (ref 5–20)
VENTRICULAR RATE: 99 BPM
VLDL: 15 MG/DL (ref 0–40)
WBC # BLD AUTO: 6.5 X10*3/UL (ref 4.4–11.3)

## 2024-11-22 PROCEDURE — 2500000002 HC RX 250 W HCPCS SELF ADMINISTERED DRUGS (ALT 637 FOR MEDICARE OP, ALT 636 FOR OP/ED): Performed by: NURSE PRACTITIONER

## 2024-11-22 PROCEDURE — 93010 ELECTROCARDIOGRAM REPORT: CPT | Performed by: INTERNAL MEDICINE

## 2024-11-22 PROCEDURE — B2111ZZ FLUOROSCOPY OF MULTIPLE CORONARY ARTERIES USING LOW OSMOLAR CONTRAST: ICD-10-PCS | Performed by: INTERNAL MEDICINE

## 2024-11-22 PROCEDURE — 2500000001 HC RX 250 WO HCPCS SELF ADMINISTERED DRUGS (ALT 637 FOR MEDICARE OP): Performed by: STUDENT IN AN ORGANIZED HEALTH CARE EDUCATION/TRAINING PROGRAM

## 2024-11-22 PROCEDURE — 80202 ASSAY OF VANCOMYCIN: CPT

## 2024-11-22 PROCEDURE — 93005 ELECTROCARDIOGRAM TRACING: CPT

## 2024-11-22 PROCEDURE — 99223 1ST HOSP IP/OBS HIGH 75: CPT | Performed by: INTERNAL MEDICINE

## 2024-11-22 PROCEDURE — 99152 MOD SED SAME PHYS/QHP 5/>YRS: CPT | Performed by: INTERNAL MEDICINE

## 2024-11-22 PROCEDURE — 83718 ASSAY OF LIPOPROTEIN: CPT | Performed by: STUDENT IN AN ORGANIZED HEALTH CARE EDUCATION/TRAINING PROGRAM

## 2024-11-22 PROCEDURE — 4A023N7 MEASUREMENT OF CARDIAC SAMPLING AND PRESSURE, LEFT HEART, PERCUTANEOUS APPROACH: ICD-10-PCS | Performed by: INTERNAL MEDICINE

## 2024-11-22 PROCEDURE — 93458 L HRT ARTERY/VENTRICLE ANGIO: CPT | Performed by: INTERNAL MEDICINE

## 2024-11-22 PROCEDURE — 85379 FIBRIN DEGRADATION QUANT: CPT | Performed by: NURSE PRACTITIONER

## 2024-11-22 PROCEDURE — 2500000004 HC RX 250 GENERAL PHARMACY W/ HCPCS (ALT 636 FOR OP/ED)

## 2024-11-22 PROCEDURE — 36415 COLL VENOUS BLD VENIPUNCTURE: CPT | Performed by: NURSE PRACTITIONER

## 2024-11-22 PROCEDURE — 36415 COLL VENOUS BLD VENIPUNCTURE: CPT | Performed by: STUDENT IN AN ORGANIZED HEALTH CARE EDUCATION/TRAINING PROGRAM

## 2024-11-22 PROCEDURE — C1894 INTRO/SHEATH, NON-LASER: HCPCS | Performed by: INTERNAL MEDICINE

## 2024-11-22 PROCEDURE — 2720000007 HC OR 272 NO HCPCS: Performed by: INTERNAL MEDICINE

## 2024-11-22 PROCEDURE — C1887 CATHETER, GUIDING: HCPCS | Performed by: INTERNAL MEDICINE

## 2024-11-22 PROCEDURE — 80048 BASIC METABOLIC PNL TOTAL CA: CPT | Performed by: STUDENT IN AN ORGANIZED HEALTH CARE EDUCATION/TRAINING PROGRAM

## 2024-11-22 PROCEDURE — 2500000004 HC RX 250 GENERAL PHARMACY W/ HCPCS (ALT 636 FOR OP/ED): Performed by: STUDENT IN AN ORGANIZED HEALTH CARE EDUCATION/TRAINING PROGRAM

## 2024-11-22 PROCEDURE — 2500000004 HC RX 250 GENERAL PHARMACY W/ HCPCS (ALT 636 FOR OP/ED): Mod: JZ | Performed by: INTERNAL MEDICINE

## 2024-11-22 PROCEDURE — 7100000001 HC RECOVERY ROOM TIME - INITIAL BASE CHARGE: Performed by: INTERNAL MEDICINE

## 2024-11-22 PROCEDURE — C1769 GUIDE WIRE: HCPCS | Performed by: INTERNAL MEDICINE

## 2024-11-22 PROCEDURE — 7100000002 HC RECOVERY ROOM TIME - EACH INCREMENTAL 1 MINUTE: Performed by: INTERNAL MEDICINE

## 2024-11-22 PROCEDURE — 2500000001 HC RX 250 WO HCPCS SELF ADMINISTERED DRUGS (ALT 637 FOR MEDICARE OP): Performed by: NURSE PRACTITIONER

## 2024-11-22 PROCEDURE — 7100000010 HC PHASE TWO TIME - EACH INCREMENTAL 1 MINUTE: Performed by: INTERNAL MEDICINE

## 2024-11-22 PROCEDURE — 84484 ASSAY OF TROPONIN QUANT: CPT | Performed by: STUDENT IN AN ORGANIZED HEALTH CARE EDUCATION/TRAINING PROGRAM

## 2024-11-22 PROCEDURE — 99024 POSTOP FOLLOW-UP VISIT: CPT | Performed by: NURSE PRACTITIONER

## 2024-11-22 PROCEDURE — 2550000001 HC RX 255 CONTRASTS: Performed by: INTERNAL MEDICINE

## 2024-11-22 PROCEDURE — 1200000002 HC GENERAL ROOM WITH TELEMETRY DAILY

## 2024-11-22 PROCEDURE — 85025 COMPLETE CBC W/AUTO DIFF WBC: CPT | Performed by: STUDENT IN AN ORGANIZED HEALTH CARE EDUCATION/TRAINING PROGRAM

## 2024-11-22 PROCEDURE — 7100000009 HC PHASE TWO TIME - INITIAL BASE CHARGE: Performed by: INTERNAL MEDICINE

## 2024-11-22 PROCEDURE — 83735 ASSAY OF MAGNESIUM: CPT | Performed by: STUDENT IN AN ORGANIZED HEALTH CARE EDUCATION/TRAINING PROGRAM

## 2024-11-22 PROCEDURE — 99233 SBSQ HOSP IP/OBS HIGH 50: CPT | Performed by: STUDENT IN AN ORGANIZED HEALTH CARE EDUCATION/TRAINING PROGRAM

## 2024-11-22 RX ORDER — ASPIRIN 81 MG/1
81 TABLET ORAL DAILY
Start: 2024-11-22 | End: 2024-11-26

## 2024-11-22 RX ORDER — MAGNESIUM SULFATE HEPTAHYDRATE 40 MG/ML
2 INJECTION, SOLUTION INTRAVENOUS ONCE
Status: COMPLETED | OUTPATIENT
Start: 2024-11-22 | End: 2024-11-22

## 2024-11-22 RX ORDER — LIDOCAINE HYDROCHLORIDE 20 MG/ML
INJECTION, SOLUTION INFILTRATION; PERINEURAL AS NEEDED
Status: DISCONTINUED | OUTPATIENT
Start: 2024-11-22 | End: 2024-11-22 | Stop reason: HOSPADM

## 2024-11-22 RX ORDER — METOPROLOL SUCCINATE 50 MG/1
50 TABLET, EXTENDED RELEASE ORAL DAILY
Status: DISCONTINUED | OUTPATIENT
Start: 2024-11-22 | End: 2024-11-26 | Stop reason: HOSPADM

## 2024-11-22 RX ORDER — ATORVASTATIN CALCIUM 20 MG/1
20 TABLET, FILM COATED ORAL NIGHTLY
Qty: 30 TABLET | Refills: 5 | Status: SHIPPED | OUTPATIENT
Start: 2024-11-22

## 2024-11-22 RX ORDER — ATORVASTATIN CALCIUM 20 MG/1
40 TABLET, FILM COATED ORAL NIGHTLY
Status: DISCONTINUED | OUTPATIENT
Start: 2024-11-22 | End: 2024-11-26 | Stop reason: HOSPADM

## 2024-11-22 RX ORDER — ASPIRIN 81 MG/1
81 TABLET ORAL DAILY
Status: DISCONTINUED | OUTPATIENT
Start: 2024-11-22 | End: 2024-11-26 | Stop reason: HOSPADM

## 2024-11-22 RX ORDER — FENTANYL CITRATE 50 UG/ML
INJECTION, SOLUTION INTRAMUSCULAR; INTRAVENOUS AS NEEDED
Status: DISCONTINUED | OUTPATIENT
Start: 2024-11-22 | End: 2024-11-22 | Stop reason: HOSPADM

## 2024-11-22 RX ORDER — MIDAZOLAM HYDROCHLORIDE 1 MG/ML
INJECTION INTRAMUSCULAR; INTRAVENOUS AS NEEDED
Status: DISCONTINUED | OUTPATIENT
Start: 2024-11-22 | End: 2024-11-22 | Stop reason: HOSPADM

## 2024-11-22 RX ORDER — VANCOMYCIN HYDROCHLORIDE 1 G/200ML
1000 INJECTION, SOLUTION INTRAVENOUS EVERY 12 HOURS
Status: DISCONTINUED | OUTPATIENT
Start: 2024-11-22 | End: 2024-11-24

## 2024-11-22 RX ORDER — SPIRONOLACTONE 25 MG/1
12.5 TABLET ORAL DAILY
Status: DISCONTINUED | OUTPATIENT
Start: 2024-11-22 | End: 2024-11-26 | Stop reason: HOSPADM

## 2024-11-22 RX ORDER — NITROGLYCERIN 40 MG/100ML
INJECTION INTRAVENOUS AS NEEDED
Status: DISCONTINUED | OUTPATIENT
Start: 2024-11-22 | End: 2024-11-22 | Stop reason: HOSPADM

## 2024-11-22 RX ORDER — HEPARIN SODIUM 1000 [USP'U]/ML
INJECTION, SOLUTION INTRAVENOUS; SUBCUTANEOUS AS NEEDED
Status: DISCONTINUED | OUTPATIENT
Start: 2024-11-22 | End: 2024-11-22 | Stop reason: HOSPADM

## 2024-11-22 ASSESSMENT — PAIN - FUNCTIONAL ASSESSMENT
PAIN_FUNCTIONAL_ASSESSMENT: 0-10

## 2024-11-22 ASSESSMENT — PAIN SCALES - GENERAL
PAINLEVEL_OUTOF10: 0 - NO PAIN
PAINLEVEL_OUTOF10: 3
PAINLEVEL_OUTOF10: 0 - NO PAIN

## 2024-11-22 ASSESSMENT — PAIN DESCRIPTION - LOCATION: LOCATION: HEAD

## 2024-11-22 ASSESSMENT — PAIN SCALES - WONG BAKER: WONGBAKER_NUMERICALRESPONSE: NO HURT

## 2024-11-22 NOTE — PROGRESS NOTES
Vancomycin Dosing by Pharmacy- FOLLOW UP    Sandee Sotelo is a 69 y.o. year old female who Pharmacy has been consulted for vancomycin dosing for cellulitis, skin and soft tissue. Based on the patient's indication and renal status this patient is being dosed based on a goal AUC of 400-600.     Renal function is currently stable.    Current vancomycin dose: 750 mg given every 12 hours    Estimated vancomycin AUC on current dose: 360 mg/L.hr     Visit Vitals  /79 (BP Location: Left arm, Patient Position: Sitting)   Pulse 101   Temp 36.7 °C (98.1 °F) (Temporal)   Resp 18        Lab Results   Component Value Date    CREATININE 0.96 2024    CREATININE 0.91 2024    CREATININE 0.46 (L) 2021    CREATININE 0.39 (L) 2021    CREATININE 0.47 (L) 2021    CREATININE 0.36 (L) 2021        Patient weight is as follows:   Vitals:    24 0043   Weight: 88.3 kg (194 lb 9.6 oz)       Cultures:  No results found for the encounter in last 14 days.       I/O last 3 completed shifts:  In: - (0 mL/kg)   Out: 1500 (17 mL/kg) [Urine:1500 (0.5 mL/kg/hr)]  Weight: 88.3 kg   I/O during current shift:  No intake/output data recorded.    Temp (24hrs), Av.9 °C (98.4 °F), Min:36.3 °C (97.3 °F), Max:37.6 °C (99.7 °F)      Assessment/Plan    Below goal AUC. Orders placed for new vancomcyin regimen of 1000 every 12 hours to begin at 1100.     This dosing regimen is predicted by [employer] to result in the following pharmacokinetic parameters:  Regimen: 1000 mg IV every 12 hours.  Start time: 11:01 on 2024  Exposure target: AUC24 (range)400-600 mg/L.hr   [id number]: 471 mg/L.hr  AUC24,ss: 533 mg/L.hr  Probability of AUC24 > 400: 89 %  Ctrough,ss: 14.8 mg/L  Probability of Ctrough,ss > 20: 17 %      The next level will be obtained on  at 0500. May be obtained sooner if clinically indicated.   Will continue to monitor renal function daily while on vancomycin and order serum creatinine at least every 48  hours if not already ordered.  Follow for continued vancomycin needs, clinical response, and signs/symptoms of toxicity.       Cynthia Rai, RPh

## 2024-11-22 NOTE — PROGRESS NOTES
Patient is stable status post LHC under the care of Dr. Lundberg.  Discussed results of procedure with patient.  Pictures provided.  Findings of the LHC revealed 30 to 40% stenosis in the mid LAD, normal circumflex artery, 50% stenosis in the PLV B and apical dyskinesis of the left ventricle with an EF of 40%.  Findings are consistent with Takotsubo cardiomyopathy.  Medical management is advised.  Patient should continue aspirin 81 mg daily and statin therapy.  Continue to optimize heart failure medications.  Continue to monitor on telemetry.  Further recommendations per general cardiology.  Post procedural activity, restrictions and potential complications discussed at length.  All questions answered.  Patient verbalized understanding.

## 2024-11-22 NOTE — POST-PROCEDURE NOTE
Physician Transition of Care Summary  Invasive Cardiovascular Lab    Procedure Date: 11/22/2024  Attending:    * Wesley Lundberg - Primary  Resident/Fellow/Other Assistant: Surgeons and Role:  * No surgeons found with a matching role *    Indications:   Pre-op Diagnosis      * New onset of congestive heart failure [I50.9]     * Bilateral lower extremity edema [R60.0]     * Elevated troponin [R79.89]    Post-procedure diagnosis:   Post-op Diagnosis     * New onset of congestive heart failure [I50.9]     * Bilateral lower extremity edema [R60.0]     * Elevated troponin [R79.89]    Procedure(s):     * Left Heart Cath, With LV      Procedure Findings:   30 to 40% stenosis of mid LAD, normal circumflex, 50% stenosis of post left ventricular branch, apical dyskinesis of left ventricle ejection fraction of 40%.  The above is consistent with Takotsubo syndrome (stress-induced cardiomyopathy    Description of the Procedure:   Left heart catheterization coronary angiography left ventriculogram    Complications:   None    Stents/Implants:   Implants       No implant documentation for this case.            Anticoagulation/Antiplatelet Plan:   Intravenous heparin    Estimated Blood Loss:   * No values recorded between 11/22/2024 12:18 PM and 11/22/2024 12:46 PM *    Anesthesia: Moderate Sedation Anesthesia Staff: No anesthesia staff entered.    Any Specimen(s) Removed:   No specimens collected during this procedure.    Disposition:   Medical therapy      Electronically signed by: Wesley Lundberg MD, 11/22/2024 12:46 PM

## 2024-11-22 NOTE — CONSULTS
Inpatient consult to Cardiology  Consult performed by: MARIO Tucker-CNP  Consult ordered by: Arpan Chisholm MD  Reason for consult: chf                                                          Date:   11/22/2024  Patient name:  Sandee Sotelo  Date of admission:  11/21/2024  1:45 PM  MRN:   [id number]  YOB: 1955  Time of Consult:  9:18 AM    Consulting Cardiologist: MARIO Dorman, CNP  Primary Cardiologist:  None  Referring Provider: Dr. Chisholm      Admission Diagnosis:     New onset of congestive heart failure      History of Present Illness:      69-year-old female with past medical history of COPD not on home O2, current nicotine abuse, pulmonary embolism previously on Eliquis, acute blood loss anemia, duodenal ulcer who presented to [facility] emergency department with complaints of worsening lower extremity swelling and increased shortness of breath.  The patient reports that she normally sees Dr. Horner but has not seen him in some time.  She was evaluated by cardiologist a few years ago but does not routinely see one.  She tells me that she was diagnosed with a pulmonary embolism in 2021 and was placed on Eliquis but had significant bleeding from that.  She was taken off of Eliquis.  She reports that she has never had a heart catheterization done.  Did have an echocardiogram in 2021 which showed normal LV function, moderate enlarged right ventricle, right atrium mild to moderately dilated with no significant mitral or aortic valve stenosis.  She did have moderately elevated pulmonary artery pressures.  She denies any chest pain.  She reports that for the past few weeks she has noticed progressive lower extremity swelling up to her lower abdomen.  She reports that even walking across to her living room she develops increased shortness of breath with dyspnea and has to take a break.  She does sleep in a recliner and does not routinely  lay flat on her back.  She does not take any current medications.  In the emergency department her BP was significantly elevated with a blood pressure of 197/86.  She was saturating 90% on room air.  Unremarkable CBC and CMP.  She had elevated BNP at 1100.  Her initial troponin was 64 but is now at 750.  Chest x-ray showed cardiomegaly and pulmonary vascular congestion with a small right pleural effusion.  Patchy right basilar infiltrate noted as well.  EKG in the emergency department showed normal sinus rhythm with possible left atrial enlargement.  Nonspecific ST wave abnormality.  No STEMI criteria.  Repeat EKG is similar.  She was admitted to the medicine service and general cardiology was consulted for chest pain.      Allergies:     Allergies   Allergen Reactions    Acetaminophen-Codeine Hives    Codeine Unknown    Penicillins Unknown       Past Medical History:     Past Medical History:   Diagnosis Date    Personal history of pneumonia (recurrent)     History of pneumonia       Past Surgical History:     Past Surgical History:   Procedure Laterality Date    OTHER SURGICAL HISTORY  2021    Knee arthroscopy    OTHER SURGICAL HISTORY  2021    Esophagogastroduodenoscopy       Family History:     Unknown past medical history of patient's mother as she did not see a physician while she was alive. Father  at age 52 from an allergic reaction to penicillin otherwise was healthy. Brother with liver disease, previous alcoholic.       Social History:     Social History     Tobacco Use    Smoking status: Every Day     Types: Cigarettes    Smokeless tobacco: Current       CURRENT INPATIENT MEDICATIONS    enoxaparin, 40 mg, subcutaneous, q24h  furosemide, 40 mg, intravenous, BID  magnesium sulfate, 2 g, intravenous, Once  perflutren lipid microspheres, 0.5-10 mL of dilution, intravenous, Once in imaging  perflutren protein A microsphere, 0.5 mL, intravenous, Once in imaging  sulfur hexafluoride  "microsphr, 2 mL, intravenous, Once in imaging  vancomycin, 1,000 mg, intravenous, q12h         No current outpatient medications     Review of Systems:      12 point review of systems was obtained in detail and is negative other than that detailed above.      Vital Signs:     Vitals:    11/21/24 2017 11/21/24 2340 11/22/24 0043 11/22/24 0742   BP: (!) 180/100 141/79  131/68   BP Location: Left arm Left arm     Patient Position: Sitting Sitting     Pulse: (!) 115 101  106   Resp: 18 18     Temp: 37.6 °C (99.7 °F) 36.7 °C (98.1 °F)  36.7 °C (98.1 °F)   TempSrc: Temporal Temporal  Temporal   SpO2: 99% 100%  100%   Weight: 88.3 kg (194 lb 9.6 oz)  88.3 kg (194 lb 9.6 oz)    Height: 1.651 m (5' 5\")          Intake/Output Summary (Last 24 hours) at 11/22/2024 0918  Last data filed at 11/21/2024 2301  Gross per 24 hour   Intake --   Output 1500 ml   Net -1500 ml       Wt Readings from Last 4 Encounters:   11/22/24 88.3 kg (194 lb 9.6 oz)   03/12/21 80.7 kg (178 lb)       Physical Examination:     Physical Exam  Vitals and nursing note reviewed.   HENT:      Head: Normocephalic.      Mouth/Throat:      Mouth: Mucous membranes are moist.   Eyes:      Pupils: Pupils are equal, round, and reactive to light.   Cardiovascular:      Rate and Rhythm: Normal rate and regular rhythm.      Heart sounds: Normal heart sounds.   Pulmonary:      Effort: Pulmonary effort is normal.      Breath sounds: Decreased air movement present. Wheezing present.   Abdominal:      Palpations: Abdomen is soft.   Musculoskeletal:      Right lower leg: 3+ Edema present.      Left lower leg: 3+ Edema present.   Skin:     General: Skin is warm and dry.   Neurological:      General: No focal deficit present.      Mental Status: She is alert and oriented to person, place, and time. Mental status is at baseline.   Psychiatric:         Mood and Affect: Mood normal.         Behavior: Behavior is cooperative.         Lab:     CBC:   Results from last 7 days "   Lab Units 11/22/24  0429 11/21/24  1426   WBC AUTO x10*3/uL 6.5 5.0   RBC AUTO x10*6/uL 5.15 5.22*   HEMOGLOBIN g/dL 15.6 16.0   HEMATOCRIT % 48.5* 49.3*   MCV fL 94 94   MCH pg 30.3 30.7   MCHC g/dL 32.2 32.5   RDW % 14.0 14.0   PLATELETS AUTO x10*3/uL 192 186     CMP:    Results from last 7 days   Lab Units 11/22/24  0429 11/21/24  1426   SODIUM mmol/L 138 139   POTASSIUM mmol/L 4.4 4.8   CHLORIDE mmol/L 101 100   CO2 mmol/L 30 35*   BUN mg/dL 18 16   CREATININE mg/dL 0.96 0.91   GLUCOSE mg/dL 100* 101*   PROTEIN TOTAL g/dL  --  6.4   CALCIUM mg/dL 8.8 9.0   BILIRUBIN TOTAL mg/dL  --  0.6   ALK PHOS U/L  --  51   AST U/L  --  11   ALT U/L  --  10     BMP:    Results from last 7 days   Lab Units 11/22/24  0429 11/21/24  1426   SODIUM mmol/L 138 139   POTASSIUM mmol/L 4.4 4.8   CHLORIDE mmol/L 101 100   CO2 mmol/L 30 35*   BUN mg/dL 18 16   CREATININE mg/dL 0.96 0.91   CALCIUM mg/dL 8.8 9.0   GLUCOSE mg/dL 100* 101*     Magnesium:  Results from last 7 days   Lab Units 11/22/24  0429 11/21/24  1426   MAGNESIUM mg/dL 1.75 1.89     Troponin:    Results from last 7 days   Lab Units 11/22/24  0429 11/21/24  1738 11/21/24  1426   TROPHS ng/L 749* 415* 64*     BNP:   Results from last 7 days   Lab Units 11/21/24  1426   BNP pg/mL 1,146*       Diagnostic Studies:   Previous cardiac testing:    Echocardiogram 2021  [address] [address]  TRANSTHORACIC ECHOCARDIOGRAM REPORT  Study Date:       1/9/2021           Referring           [id number] FINN ALEXANDER            CONCLUSIONS:   1. The left ventricular systolic function is normal with a 60-65% estimated ejection fraction.   2. Spectral Doppler shows an impaired relaxation pattern of left ventricular diastolic filling.   3. Moderately enlarged right ventricle.   4. The findings are consistent with cor pulmonale and pulmonary hypertension.   5. The right atrium is mild to moderately dilated.   6. There is mitral stenosis is not seen.   7.  Aortic valve stenosis is not present.   8. Moderately elevated pulmonary artery pressure.   9. The pulmonary artery is not well visualized.  10. The inferior vena cava appears moderately dilated.    ECG 12 Lead  Result Date: 11/22/2024    Sinus tachycardia Nonspecific T wave abnormality Abnormal ECG When compared with ECG of 21-NOV-2024 14:15, (unconfirmed) Nonspecific T wave abnormality now evident in Inferior leads Nonspecific T wave abnormality now evident in Lateral leads      Radiology:     XR chest 1 view   Final Result   Cardiomegaly with pulmonary vascular congestion and small right   pleural effusion.   Patchy right basilar infiltrate or atelectasis.   Signed by Elio Dupree MD      Transthoracic Echo (TTE) Complete    (Results Pending)       Problem List:     Patient Active Problem List   Diagnosis    New onset of congestive heart failure       Assessment:   NSTEMI  Acute heart failure  Pulmonary hypertension  Pleural effusion  Remote history of pulmonary embolism  Hypertension  COPD not on home O2  Nicotine abuse      Plan:   Admitted to medicine.  Remains on telemetry.  Telemetry shows normal sinus rhythm.  Reviewed EKG which showed normal sinus rhythm and sinus tachycardia with nonspecific ST wave abnormality.  No STEMI criteria.  Monitor on telemetry  Supplemental SpO2, keep SpO2 greater than 92%  Monitor electrolytes, keep potassium greater than 4 and magnesium greater than 2  Strict I's and O's and daily weights  Obtain echocardiogram  No recent heart catheterizations.  Patient has significant troponin elevation however denies chest pain.  There is moderate concern with rising troponin elevation that there may be a component of ACS.  She has never had a cardiac workup including catheterization or stress test.  She does continue to smoke and has known risk factors for coronary artery disease.  I discussed left heart catheterization with her and she is agreeable to proceed.  Keep n.p.o.  Check lipid  panel   Will initiate antihypertensives  Start aspirin, statin and beta-blocker  Nicotine patch for smoking cessation  Patient advised to monitor blood pressure twice daily and report any significant changes. Limit salt intake and engage in regular exercise as tolerated.   Heart failure navigator consult  Further recommendations post echocardiogram and left heart catheterization        Jorge Barnett [facility]  Adult Gerontology Acute Care Nurse Practitioner  [facility] [place]   [facility]  [phone]    Of note, this documentation is completed using the Dragon Dictation system (voice recognition software). There may be spelling and/or grammatical errors that were not corrected prior to final submission.    Electronically signed by MARIO Tucker-CNP, on 11/22/2024 at 9:18 AM     I have personally interviewed and examined the patient.   I have personally and independently reviewed labs and diagnostic testing.  I have personally verified the elements of the history and physical listed above and changes, if any, are noted.   I have personally reviewed the assessment and plan as documented by Jorge Barnett, NERY, CNP and concur.    In summary, Mrs. Sandee Sotelo does not have any history of atherosclerotic or valvular heart disease.  She has a history of acute respiratory failure in January 2021 precipitated by strep pneumonia and bilateral pulmonary emboli.  Echocardiogram showed normal LV systolic function with right heart strain.  RVSP was 39 mmHg.  She was treated with IV antibiotics and placed on Eliquis.  She was given aerosol treatments for underlying COPD.  She was readmitted 2 weeks later secondary to acute GI bleeding and anemia.  She received 5 units of packed RBCs.  Upper endoscopy was negative.  She developed some fluid overload due to aggressive fluid resuscitation.  She was placed on diuretics.  She did not have any follow-up after this  admission and eventually discontinuing furosemide.  She has a longstanding history of tobacco abuse, currently smoking less than 1/2 pack of cigarettes per day.  No history of hypertension, hyperlipidemia, or diabetes mellitus.      She presented to the emergency department yesterday afternoon with complaints of worsening edema and exertional shortness of breath.  Symptoms have progressed over the past 2 to 3 weeks.  Upon arrival she was noted to have a blood pressure of 197/86 mmHg and underlying sinus tachycardia at 111 bpm.  Oxygen saturation 90%.  CBC and CMP normal with exception of bicarbonate 35.  High-sensitivity opponent levels 64, 415, and 749.  BNP level 1146.  Chest x-ray showed cardiomegaly with pulmonary vascular congestion and small right pleural effusion.  Patchy right basilar infiltrate.  EKG shows sinus tachycardia with nonspecific ST-T wave changes.  She was diagnosed with acute congestive heart failure and admitted to telemetry.  She was placed on furosemide 40 mg IV twice daily.  She was diagnosed with bilateral lower extremity cellulitis as well.    Patient states she currently feels better.  She denies chest pain or shortness of breath.  Her vital signs are stable.  Cardiac rhythm is regular.  Lungs have decreased breath sounds in the bases with bilateral expiratory wheezes.  3+ bilateral peripheral edema.  We discussed various diagnostic and treatment options.  In light of rising troponins will plan to proceed with cardiac catheterization to exclude significant obstructive coronary artery disease.  Review echocardiogram once completed.  Check D-dimer in light of her history of untreated pulmonary emboli in 2021.  Assess for possible right heart failure.  Continue IV diuresis.  Further recommendations to follow.

## 2024-11-22 NOTE — CONSULTS
Vancomycin Dosing by Pharmacy- INITIAL    Sandee Sotelo is a 69 y.o. year old female who Pharmacy has been consulted for vancomycin dosing for cellulitis, skin and soft tissue. Based on the patient's indication and renal status this patient will be dosed based on a goal AUC of 400-600.     Renal function is currently stable.    Visit Vitals  BP (!) 180/100   Pulse 88   Temp 37.6 °C (99.7 °F)   Resp 20        Lab Results   Component Value Date    CREATININE 0.91 2024    CREATININE 0.46 (L) 2021    CREATININE 0.39 (L) 2021    CREATININE 0.47 (L) 2021    CREATININE 0.36 (L) 2021        Patient weight is as follows:   Vitals:    24 1241   Weight: 81.6 kg (180 lb)       Cultures:  No results found for the encounter in last 14 days.        No intake/output data recorded.  I/O during current shift:  No intake/output data recorded.    Temp (24hrs), Av.9 °C (98.5 °F), Min:36.3 °C (97.3 °F), Max:37.6 °C (99.7 °F)         Assessment/Plan     Patient will not be given a loading dose.  Will initiate vancomycin maintenance,  750 mg every 12 hours.    This dosing regimen is predicted by [employer] to result in the following pharmacokinetic parameters:  Regimen: 750 mg IV every 12 hours.  Start time: 20:25 on 2024  Exposure target: AUC24 (range)400-600 mg/L.hr   [id number]: 339 mg/L.hr  AUC24,ss: 454 mg/L.hr  Probability of AUC24 > 400: 65 %  Ctrough,ss: 15.1 mg/L  Probability of Ctrough,ss > 20: 22 %    Follow-up level will be ordered on  at 0000 and  0500 unless clinically indicated sooner.  Will continue to monitor renal function daily while on vancomycin and order serum creatinine at least every 48 hours if not already ordered.  Follow for continued vancomycin needs, clinical response, and signs/symptoms of toxicity.       Gela Fletcher, PharmD

## 2024-11-22 NOTE — NURSING NOTE
Vascband removed with no issues. No complaints of pain. Patient verbalized understanding of post band removal instructions. Right radial WNL.

## 2024-11-22 NOTE — H&P (VIEW-ONLY)
Inpatient consult to Cardiology  Consult performed by: MARIO Tucker-CNP  Consult ordered by: Arpan Chisholm MD  Reason for consult: chf                                                          Date:   11/22/2024  Patient name:  Sandee Sotelo  Date of admission:  11/21/2024  1:45 PM  MRN:   [id number]  YOB: 1955  Time of Consult:  9:18 AM    Consulting Cardiologist: MARIO Dorman, CNP  Primary Cardiologist:  None  Referring Provider: Dr. Chisholm      Admission Diagnosis:     New onset of congestive heart failure      History of Present Illness:      69-year-old female with past medical history of COPD not on home O2, current nicotine abuse, pulmonary embolism previously on Eliquis, acute blood loss anemia, duodenal ulcer who presented to [facility] emergency department with complaints of worsening lower extremity swelling and increased shortness of breath.  The patient reports that she normally sees Dr. Horner but has not seen him in some time.  She was evaluated by cardiologist a few years ago but does not routinely see one.  She tells me that she was diagnosed with a pulmonary embolism in 2021 and was placed on Eliquis but had significant bleeding from that.  She was taken off of Eliquis.  She reports that she has never had a heart catheterization done.  Did have an echocardiogram in 2021 which showed normal LV function, moderate enlarged right ventricle, right atrium mild to moderately dilated with no significant mitral or aortic valve stenosis.  She did have moderately elevated pulmonary artery pressures.  She denies any chest pain.  She reports that for the past few weeks she has noticed progressive lower extremity swelling up to her lower abdomen.  She reports that even walking across to her living room she develops increased shortness of breath with dyspnea and has to take a break.  She does sleep in a recliner and does not routinely  lay flat on her back.  She does not take any current medications.  In the emergency department her BP was significantly elevated with a blood pressure of 197/86.  She was saturating 90% on room air.  Unremarkable CBC and CMP.  She had elevated BNP at 1100.  Her initial troponin was 64 but is now at 750.  Chest x-ray showed cardiomegaly and pulmonary vascular congestion with a small right pleural effusion.  Patchy right basilar infiltrate noted as well.  EKG in the emergency department showed normal sinus rhythm with possible left atrial enlargement.  Nonspecific ST wave abnormality.  No STEMI criteria.  Repeat EKG is similar.  She was admitted to the medicine service and general cardiology was consulted for chest pain.      Allergies:     Allergies   Allergen Reactions    Acetaminophen-Codeine Hives    Codeine Unknown    Penicillins Unknown       Past Medical History:     Past Medical History:   Diagnosis Date    Personal history of pneumonia (recurrent)     History of pneumonia       Past Surgical History:     Past Surgical History:   Procedure Laterality Date    OTHER SURGICAL HISTORY  2021    Knee arthroscopy    OTHER SURGICAL HISTORY  2021    Esophagogastroduodenoscopy       Family History:     Unknown past medical history of patient's mother as she did not see a physician while she was alive. Father  at age 52 from an allergic reaction to penicillin otherwise was healthy. Brother with liver disease, previous alcoholic.       Social History:     Social History     Tobacco Use    Smoking status: Every Day     Types: Cigarettes    Smokeless tobacco: Current       CURRENT INPATIENT MEDICATIONS    enoxaparin, 40 mg, subcutaneous, q24h  furosemide, 40 mg, intravenous, BID  magnesium sulfate, 2 g, intravenous, Once  perflutren lipid microspheres, 0.5-10 mL of dilution, intravenous, Once in imaging  perflutren protein A microsphere, 0.5 mL, intravenous, Once in imaging  sulfur hexafluoride  "microsphr, 2 mL, intravenous, Once in imaging  vancomycin, 1,000 mg, intravenous, q12h         No current outpatient medications     Review of Systems:      12 point review of systems was obtained in detail and is negative other than that detailed above.      Vital Signs:     Vitals:    11/21/24 2017 11/21/24 2340 11/22/24 0043 11/22/24 0742   BP: (!) 180/100 141/79  131/68   BP Location: Left arm Left arm     Patient Position: Sitting Sitting     Pulse: (!) 115 101  106   Resp: 18 18     Temp: 37.6 °C (99.7 °F) 36.7 °C (98.1 °F)  36.7 °C (98.1 °F)   TempSrc: Temporal Temporal  Temporal   SpO2: 99% 100%  100%   Weight: 88.3 kg (194 lb 9.6 oz)  88.3 kg (194 lb 9.6 oz)    Height: 1.651 m (5' 5\")          Intake/Output Summary (Last 24 hours) at 11/22/2024 0918  Last data filed at 11/21/2024 2301  Gross per 24 hour   Intake --   Output 1500 ml   Net -1500 ml       Wt Readings from Last 4 Encounters:   11/22/24 88.3 kg (194 lb 9.6 oz)   03/12/21 80.7 kg (178 lb)       Physical Examination:     Physical Exam  Vitals and nursing note reviewed.   HENT:      Head: Normocephalic.      Mouth/Throat:      Mouth: Mucous membranes are moist.   Eyes:      Pupils: Pupils are equal, round, and reactive to light.   Cardiovascular:      Rate and Rhythm: Normal rate and regular rhythm.      Heart sounds: Normal heart sounds.   Pulmonary:      Effort: Pulmonary effort is normal.      Breath sounds: Decreased air movement present. Wheezing present.   Abdominal:      Palpations: Abdomen is soft.   Musculoskeletal:      Right lower leg: 3+ Edema present.      Left lower leg: 3+ Edema present.   Skin:     General: Skin is warm and dry.   Neurological:      General: No focal deficit present.      Mental Status: She is alert and oriented to person, place, and time. Mental status is at baseline.   Psychiatric:         Mood and Affect: Mood normal.         Behavior: Behavior is cooperative.         Lab:     CBC:   Results from last 7 days "   Lab Units 11/22/24  0429 11/21/24  1426   WBC AUTO x10*3/uL 6.5 5.0   RBC AUTO x10*6/uL 5.15 5.22*   HEMOGLOBIN g/dL 15.6 16.0   HEMATOCRIT % 48.5* 49.3*   MCV fL 94 94   MCH pg 30.3 30.7   MCHC g/dL 32.2 32.5   RDW % 14.0 14.0   PLATELETS AUTO x10*3/uL 192 186     CMP:    Results from last 7 days   Lab Units 11/22/24  0429 11/21/24  1426   SODIUM mmol/L 138 139   POTASSIUM mmol/L 4.4 4.8   CHLORIDE mmol/L 101 100   CO2 mmol/L 30 35*   BUN mg/dL 18 16   CREATININE mg/dL 0.96 0.91   GLUCOSE mg/dL 100* 101*   PROTEIN TOTAL g/dL  --  6.4   CALCIUM mg/dL 8.8 9.0   BILIRUBIN TOTAL mg/dL  --  0.6   ALK PHOS U/L  --  51   AST U/L  --  11   ALT U/L  --  10     BMP:    Results from last 7 days   Lab Units 11/22/24  0429 11/21/24  1426   SODIUM mmol/L 138 139   POTASSIUM mmol/L 4.4 4.8   CHLORIDE mmol/L 101 100   CO2 mmol/L 30 35*   BUN mg/dL 18 16   CREATININE mg/dL 0.96 0.91   CALCIUM mg/dL 8.8 9.0   GLUCOSE mg/dL 100* 101*     Magnesium:  Results from last 7 days   Lab Units 11/22/24  0429 11/21/24  1426   MAGNESIUM mg/dL 1.75 1.89     Troponin:    Results from last 7 days   Lab Units 11/22/24  0429 11/21/24  1738 11/21/24  1426   TROPHS ng/L 749* 415* 64*     BNP:   Results from last 7 days   Lab Units 11/21/24  1426   BNP pg/mL 1,146*       Diagnostic Studies:   Previous cardiac testing:    Echocardiogram 2021  [address] [address]  TRANSTHORACIC ECHOCARDIOGRAM REPORT  Study Date:       1/9/2021           Referring           [id number] FIN[address] ALEXANDER            CONCLUSIONS:   1. The left ventricular systolic function is normal with a 60-65% estimated ejection fraction.   2. Spectral Doppler shows an impaired relaxation pattern of left ventricular diastolic filling.   3. Moderately enlarged right ventricle.   4. The findings are consistent with cor pulmonale and pulmonary hypertension.   5. The right atrium is mild to moderately dilated.   6. There is mitral stenosis is not seen.   7.  Aortic valve stenosis is not present.   8. Moderately elevated pulmonary artery pressure.   9. The pulmonary artery is not well visualized.  10. The inferior vena cava appears moderately dilated.    ECG 12 Lead  Result Date: 11/22/2024    Sinus tachycardia Nonspecific T wave abnormality Abnormal ECG When compared with ECG of 21-NOV-2024 14:15, (unconfirmed) Nonspecific T wave abnormality now evident in Inferior leads Nonspecific T wave abnormality now evident in Lateral leads      Radiology:     XR chest 1 view   Final Result   Cardiomegaly with pulmonary vascular congestion and small right   pleural effusion.   Patchy right basilar infiltrate or atelectasis.   Signed by Elio Dupree MD      Transthoracic Echo (TTE) Complete    (Results Pending)       Problem List:     Patient Active Problem List   Diagnosis    New onset of congestive heart failure       Assessment:   NSTEMI  Acute heart failure  Pulmonary hypertension  Pleural effusion  Remote history of pulmonary embolism  Hypertension  COPD not on home O2  Nicotine abuse      Plan:   Admitted to medicine.  Remains on telemetry.  Telemetry shows normal sinus rhythm.  Reviewed EKG which showed normal sinus rhythm and sinus tachycardia with nonspecific ST wave abnormality.  No STEMI criteria.  Monitor on telemetry  Supplemental SpO2, keep SpO2 greater than 92%  Monitor electrolytes, keep potassium greater than 4 and magnesium greater than 2  Strict I's and O's and daily weights  Obtain echocardiogram  No recent heart catheterizations.  Patient has significant troponin elevation however denies chest pain.  There is moderate concern with rising troponin elevation that there may be a component of ACS.  She has never had a cardiac workup including catheterization or stress test.  She does continue to smoke and has known risk factors for coronary artery disease.  I discussed left heart catheterization with her and she is agreeable to proceed.  Keep n.p.o.  Check lipid  panel   Will initiate antihypertensives  Start aspirin, statin and beta-blocker  Nicotine patch for smoking cessation  Patient advised to monitor blood pressure twice daily and report any significant changes. Limit salt intake and engage in regular exercise as tolerated.   Heart failure navigator consult  Further recommendations post echocardiogram and left heart catheterization        Jorge Barnett [facility]  Adult Gerontology Acute Care Nurse Practitioner  [facility] [place]   [facility]  [phone]    Of note, this documentation is completed using the Dragon Dictation system (voice recognition software). There may be spelling and/or grammatical errors that were not corrected prior to final submission.    Electronically signed by MARIO Tucker-CNP, on 11/22/2024 at 9:18 AM     I have personally interviewed and examined the patient.   I have personally and independently reviewed labs and diagnostic testing.  I have personally verified the elements of the history and physical listed above and changes, if any, are noted.   I have personally reviewed the assessment and plan as documented by Jorge Barnett, NERY, CNP and concur.    In summary, Mrs. Sandee Sotelo does not have any history of atherosclerotic or valvular heart disease.  She has a history of acute respiratory failure in January 2021 precipitated by strep pneumonia and bilateral pulmonary emboli.  Echocardiogram showed normal LV systolic function with right heart strain.  RVSP was 39 mmHg.  She was treated with IV antibiotics and placed on Eliquis.  She was given aerosol treatments for underlying COPD.  She was readmitted 2 weeks later secondary to acute GI bleeding and anemia.  She received 5 units of packed RBCs.  Upper endoscopy was negative.  She developed some fluid overload due to aggressive fluid resuscitation.  She was placed on diuretics.  She did not have any follow-up after this  admission and eventually discontinuing furosemide.  She has a longstanding history of tobacco abuse, currently smoking less than 1/2 pack of cigarettes per day.  No history of hypertension, hyperlipidemia, or diabetes mellitus.      She presented to the emergency department yesterday afternoon with complaints of worsening edema and exertional shortness of breath.  Symptoms have progressed over the past 2 to 3 weeks.  Upon arrival she was noted to have a blood pressure of 197/86 mmHg and underlying sinus tachycardia at 111 bpm.  Oxygen saturation 90%.  CBC and CMP normal with exception of bicarbonate 35.  High-sensitivity opponent levels 64, 415, and 749.  BNP level 1146.  Chest x-ray showed cardiomegaly with pulmonary vascular congestion and small right pleural effusion.  Patchy right basilar infiltrate.  EKG shows sinus tachycardia with nonspecific ST-T wave changes.  She was diagnosed with acute congestive heart failure and admitted to telemetry.  She was placed on furosemide 40 mg IV twice daily.  She was diagnosed with bilateral lower extremity cellulitis as well.    Patient states she currently feels better.  She denies chest pain or shortness of breath.  Her vital signs are stable.  Cardiac rhythm is regular.  Lungs have decreased breath sounds in the bases with bilateral expiratory wheezes.  3+ bilateral peripheral edema.  We discussed various diagnostic and treatment options.  In light of rising troponins will plan to proceed with cardiac catheterization to exclude significant obstructive coronary artery disease.  Review echocardiogram once completed.  Check D-dimer in light of her history of untreated pulmonary emboli in 2021.  Assess for possible right heart failure.  Continue IV diuresis.  Further recommendations to follow.

## 2024-11-22 NOTE — NURSING NOTE
Patient back from Cath Lab procedure. Right radial site WNL with no hematoma or bleeding. VSS with no complaints of pain. Telemetry on.  Call light in reach. Will continue to monitor. No family here at this time.

## 2024-11-22 NOTE — PROGRESS NOTES
Medical Group Progress Note  ASSESSMENT & PLAN:     Acute on chronic diastolic CHF exacerbation  Takotsubo syndrome  - Previous echo in 2021 with diastolic dysfunction  - Repeat echocardiogram ordered  - Lasix 40 g IV twice daily  - Strict I's and O's, daily weights, 2 g sodium restriction diet  - Cardiology following  - Heart failure navigator     Elevated troponin  - Troponin peaked 749  - Cardiology following  - Left heart cath reviewed with mild CAD, EF 40% and findings consistent with Takotsubo    Elevated D-dimer  - Ordered today per cardiology  - Potential CTA chest tomorrow to rule out PE     Bilateral lower extremity cellulitis  - Significant erythema, warmth of the bilateral lower extremities  - Continue vancomycin at this time     VTE Prophylaxis: Enoxaparin subcutaneous     Disposition/Daily update: Seen by cardiology, went for left heart catheterization today which showed mild coronary artery disease with an EF of 40%.  Findings were consistent with possible Takotsubo syndrome.  Echo still pending this afternoon.  D-dimer slightly elevated, spoke with cardiology who recommends possible CTA chest tomorrow to reduce contrast exposure in a 24-hour period.  Diuresing well with Lasix, continue vancomycin as well for lower extremity cellulitis.      ---Of note, this documentation is completed using the Dragon Dictation system (voice recognition software). There may be spelling and/or grammatical errors that were not corrected prior to final submission.---    Arpan Chisholm MD    SUBJECTIVE     Patient was seen and examined at bedside this morning.  Feels her shortness of breath is stable, did diurese significantly yesterday with Lasix.    OBJECTIVE:     Last Recorded Vitals:  Vitals:    11/22/24 1219 11/22/24 1219 11/22/24 1247 11/22/24 1300   BP: 131/68  142/80 129/69   Pulse: 106  (!) 130 110   Resp: 18  18 18   Temp:       TempSrc:       SpO2: 100% 100% 100% 94%   Weight:       Height:         Last  I/O:  I/O last 3 completed shifts:  In: - (0 mL/kg)   Out: 1500 (17 mL/kg) [Urine:1500 (0.5 mL/kg/hr)]  Weight: 88.3 kg     Physical Exam  Vitals reviewed.   Constitutional:       General: She is not in acute distress.     Appearance: Normal appearance. She is not toxic-appearing.   HENT:      Head: Normocephalic and atraumatic.   Eyes:      Extraocular Movements: Extraocular movements intact.      Conjunctiva/sclera: Conjunctivae normal.   Cardiovascular:      Rate and Rhythm: Normal rate and regular rhythm.      Pulses: Normal pulses.      Heart sounds: Normal heart sounds.   Pulmonary:      Effort: Pulmonary effort is normal. No respiratory distress.      Breath sounds: No wheezing.      Comments: Breath sounds are diminished to auscultation bilaterally, but improved from previous exam.  Abdominal:      General: Bowel sounds are normal. There is no distension.      Palpations: Abdomen is soft.      Tenderness: There is no abdominal tenderness. There is no guarding.   Musculoskeletal:         General: Swelling present. Normal range of motion.      Cervical back: Normal range of motion and neck supple.      Right lower leg: Edema present.   Skin:     Findings: Erythema present.      Comments: Lower extremities with continued erythema and warmth on palpation.   Neurological:      General: No focal deficit present.      Mental Status: She is alert and oriented to person, place, and time. Mental status is at baseline.     Inpatient Medications:  aspirin, 81 mg, oral, Daily  atorvastatin, 40 mg, oral, Nightly  empagliflozin, 10 mg, oral, Daily  enoxaparin, 40 mg, subcutaneous, q24h  furosemide, 40 mg, intravenous, BID  metoprolol succinate XL, 50 mg, oral, Daily  perflutren lipid microspheres, 0.5-10 mL of dilution, intravenous, Once in imaging  sacubitriL-valsartan, 1 tablet, oral, BID  spironolactone, 12.5 mg, oral, Daily  vancomycin, 1,000 mg, intravenous, q12h    PRN Medications  PRN medications: acetaminophen  **OR** acetaminophen **OR** acetaminophen, polyethylene glycol, vancomycin  Continuous Medications:     LABS AND IMAGING:     Labs:  Results from last 7 days   Lab Units 11/22/24  0429 11/21/24  1426   WBC AUTO x10*3/uL 6.5 5.0   RBC AUTO x10*6/uL 5.15 5.22*   HEMOGLOBIN g/dL 15.6 16.0   HEMATOCRIT % 48.5* 49.3*   MCV fL 94 94   MCH pg 30.3 30.7   MCHC g/dL 32.2 32.5   RDW % 14.0 14.0   PLATELETS AUTO x10*3/uL 192 186     Results from last 7 days   Lab Units 11/22/24  0429 11/21/24  1426   SODIUM mmol/L 138 139   POTASSIUM mmol/L 4.4 4.8   CHLORIDE mmol/L 101 100   CO2 mmol/L 30 35*   BUN mg/dL 18 16   CREATININE mg/dL 0.96 0.91   GLUCOSE mg/dL 100* 101*   PROTEIN TOTAL g/dL  --  6.4   CALCIUM mg/dL 8.8 9.0   BILIRUBIN TOTAL mg/dL  --  0.6   ALK PHOS U/L  --  51   AST U/L  --  11   ALT U/L  --  10     Results from last 7 days   Lab Units 11/22/24  0429 11/21/24  1426   MAGNESIUM mg/dL 1.75 1.89     Results from last 7 days   Lab Units 11/22/24  0429 11/21/24  1738 11/21/24  1426   TROPHS ng/L 749* 415* 64*     Imaging:  Cardiac Catheterization Procedure     [facility], Cath Lab         [address] [place] [address]    Cardiovascular Catheterization Report    Patient Name:      JASON SCHAFER         Performing Physician:  Rosalio Lundberg MD  Study Date:        11/22/2024         Verifying Physician:   Rosalio Lundberg MD  MRN/PID:           [id number]           Cardiologist/Co-Scrub:  Accession#:        [id number]       Ordering Provider:     [id number] CANDELARIA RANDALL  Date of Birth/Age: 1955 / 69      Cardiologist:                     years  Gender:            F                  Fellow:  Encounter#:        [id number]         Surgeon:       Study:            Coronary Arteriogram  Additional Study: Left Heart  Cath       Indications:  JASON SCHAFER is a 70 year old female who presents with dyslipidemia, hypertension, chronic pulmonary disease, Smoker, CHF and a chest pain assessment of typical angina. NSTE - ACS.     Procedure Description:  After infiltration with 2% Lidocaine, the right radial artery was cannulated with a modified Seldinger technique. Subsequently a 5/6 Slender sheath was placed in the right radial artery. Multiple injections of contrast were made into the left and right coronary arteries with angiograms recorded in multiple projections. Retrograde left heart catheterizion was accomplished with a 5 Fr. pigtail catheter. A single plane left ventriculogram was recorded in the 30 degree PULIDO projection. The contrast dose was 20 ml injected at 10 ml/sec. The catheter was then withdrawn across the aortic valve under continuous pressure monitoring and removed.     Coronary Angiography:  The coronary circulation is right dominant.     Left Main Coronary Artery:  The left main coronary artery is free of atherosclerotic disease.     Left Anterior Descending Coronary Artery Distribution:  The Left Anterior Descending artery is a medium-sized vessel. There is 10-30% stenosis in the mid left anterior descending artery. Presents luminal irregularities.     Circumflex Coronary Artery Distribution:  The Left Circumflex artery is a medium-sized vessel. Is free of atherosclerotic disease.     Right Coronary Artery Distribution:    The Right Coronary Artery is a large vessel. Presents luminal irregularities. There is 50% stenosis in the the Posterior Lateral Branch Right Coronary Artery.       Left Ventriculography:  The estimated left ventricular ejection fraction is abnormal at 40%. There is apical dyskinesis noted. The above is consistent with Takotsubo syndrome, stress-induced cardiomyopathy.     Hemo Personnel:  +---------------+---------+  Name           Duty       +---------------+---------+  Wesley Lundberg  MD 1  +---------------+---------+       Hemodynamic Pressures:     +----+---------------+----------+-------------+--------------+-------+---------+  Site   Date Time   Phase NameSystolic mmHgDiastolic mmHgED mmHgMean mmHg  +----+---------------+----------+-------------+--------------+-------+---------+    AO     11/22/2024  AIR REST          109            81              95          12:41:13 PM                                                       +----+---------------+----------+-------------+--------------+-------+---------+    LV     11/22/2024  AIR REST          101             0     40                   12:44:06 PM                                                       +----+---------------+----------+-------------+--------------+-------+---------+       Cardiac Cath Post Procedure Notes:  Post Procedure Diagnosis: Mild CAD.  Blood Loss:               Estimated blood loss during the procedure was 3 mls.  Specimens Removed:        Number of specimen(s) removed: none.    ____________________________________________________________________________________  CONCLUSIONS:   1. Left Main Coronary Artery: This artery is normal.   2. Left Anterior Descending Artery: presents luminal irregularities.   3. Mid LAD Lesion: The percent stenosis is 10-30%.   4. Circumflex Coronary Artery: normal.   5. Right Coronary Artery: presents luminal irregularities.   6. Posterior Lateral Branch RCA Lesion: The percent stenosis is 50%.   7. Mild coronary disease.   8. Apical dyskinesis with ejection fraction of 40% consistent with Takotsubo syndrome, stress-induced cardiomyopathy.   9. The Left Ventricular Ejection Fraction is 40%.  10. LV: apical dyskinesis.  11. LV: The above is consistent with Takotsubo syndrome, stress-induced cardiomyopathy.    ICD 10 Codes:  Non ST elevation (NSTEMI) myocardial infarction-I21.4     CPT Codes:  Left Heart Cath (visualization of coronaries) and [id number];  Moderate Sedation Services 1st additional 15 minutes patient >5 years-[id number]; Moderate Sedation Services 2nd additional 15 minutes patient >5 [id number]     [id number] Wesley Lundberg MD  Performing Physician  Electronically signed by [id number] Wesley Lundberg MD on 11/22/2024 at 12:57:42 PM         ** Final **  ECG 12 Lead  Sinus tachycardia  Nonspecific T wave abnormality  Abnormal ECG  When compared with ECG of 21-NOV-2024 14:15, (unconfirmed)  Nonspecific T wave abnormality now evident in Inferior leads  Nonspecific T wave abnormality now evident in Lateral leads

## 2024-11-22 NOTE — CARE PLAN
The patient's goals for the shift include get rest    The clinical goals for the shift include pt will be free of falls this shift      Problem: Safety - Adult  Goal: Free from fall injury  Outcome: Met       Problem: Fall/Injury  Goal: Not fall by end of shift  Outcome: Met

## 2024-11-23 ENCOUNTER — APPOINTMENT (OUTPATIENT)
Dept: CARDIOLOGY | Facility: HOSPITAL | Age: 69
End: 2024-11-23
Payer: MEDICARE

## 2024-11-23 ENCOUNTER — APPOINTMENT (OUTPATIENT)
Dept: RADIOLOGY | Facility: HOSPITAL | Age: 69
End: 2024-11-23
Payer: MEDICARE

## 2024-11-23 ENCOUNTER — APPOINTMENT (OUTPATIENT)
Dept: CARDIOLOGY | Facility: HOSPITAL | Age: 69
DRG: 280 | End: 2024-11-23
Payer: MEDICARE

## 2024-11-23 PROBLEM — S80.822A: Status: ACTIVE | Noted: 2024-11-23

## 2024-11-23 PROBLEM — L03.116 BILATERAL LOWER LEG CELLULITIS: Status: ACTIVE | Noted: 2024-11-23

## 2024-11-23 PROBLEM — R79.89 ELEVATED D-DIMER: Status: ACTIVE | Noted: 2024-11-23

## 2024-11-23 PROBLEM — L08.9: Status: ACTIVE | Noted: 2024-11-23

## 2024-11-23 PROBLEM — L03.115 BILATERAL LOWER LEG CELLULITIS: Status: ACTIVE | Noted: 2024-11-23

## 2024-11-23 PROBLEM — I51.81 TAKOTSUBO SYNDROME: Status: ACTIVE | Noted: 2024-11-23

## 2024-11-23 LAB
ANION GAP SERPL CALC-SCNC: 8 MMOL/L (ref 10–20)
AORTIC VALVE MEAN GRADIENT: 3 MMHG
AORTIC VALVE PEAK VELOCITY: 1.12 M/S
AV PEAK GRADIENT: 5 MMHG
BASOPHILS # BLD AUTO: 0.04 X10*3/UL (ref 0–0.1)
BASOPHILS NFR BLD AUTO: 0.8 %
BUN SERPL-MCNC: 23 MG/DL (ref 6–23)
CALCIUM SERPL-MCNC: 8.3 MG/DL (ref 8.6–10.3)
CHLORIDE SERPL-SCNC: 96 MMOL/L (ref 98–107)
CO2 SERPL-SCNC: 39 MMOL/L (ref 21–32)
CREAT SERPL-MCNC: 1.05 MG/DL (ref 0.5–1.05)
EGFRCR SERPLBLD CKD-EPI 2021: 58 ML/MIN/1.73M*2
EJECTION FRACTION APICAL 4 CHAMBER: 70.1
EJECTION FRACTION: 63 %
EOSINOPHIL # BLD AUTO: 0.15 X10*3/UL (ref 0–0.7)
EOSINOPHIL NFR BLD AUTO: 2.8 %
ERYTHROCYTE [DISTWIDTH] IN BLOOD BY AUTOMATED COUNT: 13.6 % (ref 11.5–14.5)
GLUCOSE SERPL-MCNC: 90 MG/DL (ref 74–99)
HCT VFR BLD AUTO: 46.4 % (ref 36–46)
HGB BLD-MCNC: 14.6 G/DL (ref 12–16)
HOLD SPECIMEN: NORMAL
IMM GRANULOCYTES # BLD AUTO: 0.01 X10*3/UL (ref 0–0.7)
IMM GRANULOCYTES NFR BLD AUTO: 0.2 % (ref 0–0.9)
LEFT ATRIUM VOLUME AREA LENGTH INDEX BSA: 27.9 ML/M2
LEFT VENTRICLE INTERNAL DIMENSION DIASTOLE: 4.53 CM (ref 3.5–6)
LV EJECTION FRACTION BIPLANE: 66 %
LYMPHOCYTES # BLD AUTO: 0.72 X10*3/UL (ref 1.2–4.8)
LYMPHOCYTES NFR BLD AUTO: 13.7 %
MAGNESIUM SERPL-MCNC: 1.83 MG/DL (ref 1.6–2.4)
MCH RBC QN AUTO: 30.2 PG (ref 26–34)
MCHC RBC AUTO-ENTMCNC: 31.5 G/DL (ref 32–36)
MCV RBC AUTO: 96 FL (ref 80–100)
MITRAL VALVE E/A RATIO: 0.76
MONOCYTES # BLD AUTO: 0.56 X10*3/UL (ref 0.1–1)
MONOCYTES NFR BLD AUTO: 10.6 %
NEUTROPHILS # BLD AUTO: 3.79 X10*3/UL (ref 1.2–7.7)
NEUTROPHILS NFR BLD AUTO: 71.9 %
NRBC BLD-RTO: 0 /100 WBCS (ref 0–0)
PLATELET # BLD AUTO: 171 X10*3/UL (ref 150–450)
POTASSIUM SERPL-SCNC: 3.9 MMOL/L (ref 3.5–5.3)
RBC # BLD AUTO: 4.84 X10*6/UL (ref 4–5.2)
RIGHT VENTRICLE FREE WALL PEAK S': 8.92 CM/S
RIGHT VENTRICLE PEAK SYSTOLIC PRESSURE: 71 MMHG
SODIUM SERPL-SCNC: 139 MMOL/L (ref 136–145)
TRICUSPID ANNULAR PLANE SYSTOLIC EXCURSION: 1.3 CM
VANCOMYCIN SERPL-MCNC: 7.7 UG/ML (ref 5–20)
WBC # BLD AUTO: 5.3 X10*3/UL (ref 4.4–11.3)

## 2024-11-23 PROCEDURE — 99232 SBSQ HOSP IP/OBS MODERATE 35: CPT | Performed by: INTERNAL MEDICINE

## 2024-11-23 PROCEDURE — 36415 COLL VENOUS BLD VENIPUNCTURE: CPT | Performed by: STUDENT IN AN ORGANIZED HEALTH CARE EDUCATION/TRAINING PROGRAM

## 2024-11-23 PROCEDURE — 93010 ELECTROCARDIOGRAM REPORT: CPT | Performed by: INTERNAL MEDICINE

## 2024-11-23 PROCEDURE — 93306 TTE W/DOPPLER COMPLETE: CPT

## 2024-11-23 PROCEDURE — 93970 EXTREMITY STUDY: CPT

## 2024-11-23 PROCEDURE — 2500000001 HC RX 250 WO HCPCS SELF ADMINISTERED DRUGS (ALT 637 FOR MEDICARE OP): Performed by: STUDENT IN AN ORGANIZED HEALTH CARE EDUCATION/TRAINING PROGRAM

## 2024-11-23 PROCEDURE — 2500000002 HC RX 250 W HCPCS SELF ADMINISTERED DRUGS (ALT 637 FOR MEDICARE OP, ALT 636 FOR OP/ED): Performed by: NURSE PRACTITIONER

## 2024-11-23 PROCEDURE — 83735 ASSAY OF MAGNESIUM: CPT | Performed by: STUDENT IN AN ORGANIZED HEALTH CARE EDUCATION/TRAINING PROGRAM

## 2024-11-23 PROCEDURE — 2500000004 HC RX 250 GENERAL PHARMACY W/ HCPCS (ALT 636 FOR OP/ED)

## 2024-11-23 PROCEDURE — 2500000004 HC RX 250 GENERAL PHARMACY W/ HCPCS (ALT 636 FOR OP/ED): Performed by: NURSE PRACTITIONER

## 2024-11-23 PROCEDURE — 80048 BASIC METABOLIC PNL TOTAL CA: CPT | Performed by: STUDENT IN AN ORGANIZED HEALTH CARE EDUCATION/TRAINING PROGRAM

## 2024-11-23 PROCEDURE — 93005 ELECTROCARDIOGRAM TRACING: CPT

## 2024-11-23 PROCEDURE — 80202 ASSAY OF VANCOMYCIN: CPT

## 2024-11-23 PROCEDURE — 93306 TTE W/DOPPLER COMPLETE: CPT | Performed by: INTERNAL MEDICINE

## 2024-11-23 PROCEDURE — 2500000001 HC RX 250 WO HCPCS SELF ADMINISTERED DRUGS (ALT 637 FOR MEDICARE OP): Performed by: NURSE PRACTITIONER

## 2024-11-23 PROCEDURE — 93971 EXTREMITY STUDY: CPT | Performed by: RADIOLOGY

## 2024-11-23 PROCEDURE — 85025 COMPLETE CBC W/AUTO DIFF WBC: CPT | Performed by: STUDENT IN AN ORGANIZED HEALTH CARE EDUCATION/TRAINING PROGRAM

## 2024-11-23 PROCEDURE — 99232 SBSQ HOSP IP/OBS MODERATE 35: CPT | Performed by: NURSE PRACTITIONER

## 2024-11-23 PROCEDURE — 1200000002 HC GENERAL ROOM WITH TELEMETRY DAILY

## 2024-11-23 RX ORDER — FUROSEMIDE 40 MG/1
40 TABLET ORAL DAILY
Status: DISCONTINUED | OUTPATIENT
Start: 2024-11-24 | End: 2024-11-24

## 2024-11-23 RX ORDER — MAGNESIUM SULFATE HEPTAHYDRATE 40 MG/ML
2 INJECTION, SOLUTION INTRAVENOUS ONCE
Status: COMPLETED | OUTPATIENT
Start: 2024-11-23 | End: 2024-11-23

## 2024-11-23 RX ORDER — FUROSEMIDE 10 MG/ML
40 INJECTION INTRAMUSCULAR; INTRAVENOUS ONCE
Status: COMPLETED | OUTPATIENT
Start: 2024-11-23 | End: 2024-11-23

## 2024-11-23 ASSESSMENT — COGNITIVE AND FUNCTIONAL STATUS - GENERAL
MOBILITY SCORE: 24
DAILY ACTIVITIY SCORE: 24

## 2024-11-23 ASSESSMENT — PAIN DESCRIPTION - LOCATION: LOCATION: FOOT

## 2024-11-23 ASSESSMENT — PAIN SCALES - GENERAL
PAINLEVEL_OUTOF10: 0 - NO PAIN
PAINLEVEL_OUTOF10: 3

## 2024-11-23 ASSESSMENT — PAIN DESCRIPTION - ORIENTATION: ORIENTATION: RIGHT;LEFT

## 2024-11-23 ASSESSMENT — PAIN - FUNCTIONAL ASSESSMENT: PAIN_FUNCTIONAL_ASSESSMENT: 0-10

## 2024-11-23 NOTE — PROGRESS NOTES
Vancomycin Dosing by Pharmacy- FOLLOW UP    Sandee Sotelo is a 69 y.o. year old female who Pharmacy has been consulted for vancomycin dosing for cellulitis, skin and soft tissue. Based on the patient's indication and renal status this patient is being dosed based on a goal AUC of 400-600.     Renal function is currently declining.    Current vancomycin dose: 1000 mg given every 12 hours    Estimated vancomycin AUC on current dose: 590 mg/L.hr     Visit Vitals  /54   Pulse 88   Temp 36.8 °C (98.2 °F) (Temporal)   Resp 18        Lab Results   Component Value Date    CREATININE 1.05 2024    CREATININE 0.96 2024    CREATININE 0.91 2024    CREATININE 0.46 (L) 2021    CREATININE 0.39 (L) 2021    CREATININE 0.47 (L) 2021    CREATININE 0.36 (L) 2021        Patient weight is as follows:   Vitals:    24 0543   Weight: 83.2 kg (183 lb 6.8 oz)       Cultures:  No results found for the encounter in last 14 days.       I/O last 3 completed shifts:  In: 200 (2.4 mL/kg) [IV Piggyback:200]  Out: 1800 (21.6 mL/kg) [Urine:1800 (0.6 mL/kg/hr)]  Weight: 83.2 kg   I/O during current shift:  No intake/output data recorded.    Temp (24hrs), Av °C (98.6 °F), Min:36.8 °C (98.2 °F), Max:37.2 °C (99 °F)      Assessment/Plan    Within goal AUC range. Continue current vancomycin regimen.    This dosing regimen is predicted by [employer] to result in the following pharmacokinetic parameters:  Loading dose: N/A  Regimen: 1000 mg IV every 12 hours.  Start time: 16:03 on 2024  Exposure target: AUC24 (range)400-600 mg/L.hr   RCX02-12: 528 mg/L.hr  AUC24,ss: 590 mg/L.hr  Probability of AUC24 > 400: 99 %  Ctrough,ss: 16.8 mg/L  Probability of Ctrough,ss > 20: 22 %    The next level will be obtained on  at 0500. May be obtained sooner if clinically indicated.   Will continue to monitor renal function daily while on vancomycin and order serum creatinine at least every 48 hours if not  already ordered.  Follow for continued vancomycin needs, clinical response, and signs/symptoms of toxicity.       Roxana Barrera, PharmD

## 2024-11-23 NOTE — CARE PLAN
The patient's goals for the shift include get rest    The clinical goals for the shift include pt will be free of falls this shift    Problem: Pain - Adult  Goal: Verbalizes/displays adequate comfort level or baseline comfort level  Outcome: Progressing

## 2024-11-23 NOTE — CONSULTS
PODIATRY CONSULT NOTE    SERVICE DATE: 11/23/2024   SERVICE TIME:  1:29 PM      REASON FOR CONSULT: Blisters left foot  REQUESTING PHYSICIAN: Chetan Bruce MD   PRIMARY CARE PHYSICIAN: Ravi Horner MD    Subjective   HPI:  Ms. Sotelo is a 69 y.o. female who presents for acute on chronic diastolic CHF exacerbation.  Patient presented to the emergency department with bilateral lower extremity swelling and shortness of breath.  Patient was admitted and treated for CHF exacerbation.  Podiatry was consulted today to examine bulla formation to the left medial first digit.  Patient states that she believes the bulla formed after her edema which has now started to go down.  She denies any trauma to the foot.  She says it may have been formed from shoes rubbing while her foot was swollen.  She denies any constitutional symptoms at this time.  No other pedal complaints.        ROS: 10-point review of systems was performed and is otherwise negative except as noted in HPI.  PMH: Reviewed/documented below.  PSH:  Noncontributory except per HPI   FH: Reviewed and noncontributory   SOCIAL:  Reviewed/documented below.  ALLERGIES: Reviewed/documented below.  MEDS: Reviewed/documented below.  VS: Reviewed/documented below.    Past Medical History:   Diagnosis Date    Personal history of pneumonia (recurrent)     History of pneumonia     Past Surgical History:   Procedure Laterality Date    CARDIAC CATHETERIZATION N/A 11/22/2024    Procedure: Left Heart Cath, With LV;  Surgeon: Wesley Lundberg MD;  Location: [facility];  Service: Cardiovascular;  Laterality: N/A;    OTHER SURGICAL HISTORY  03/12/2021    Knee arthroscopy    OTHER SURGICAL HISTORY  03/12/2021    Esophagogastroduodenoscopy     No family history on file.  Social History     Tobacco Use    Smoking status: Every Day     Types: Cigarettes    Smokeless tobacco: Current      No medications prior to admission.        Medications:  Scheduled Meds: aspirin, 81 mg,  oral, Daily  atorvastatin, 40 mg, oral, Nightly  empagliflozin, 10 mg, oral, Daily  enoxaparin, 40 mg, subcutaneous, q24h  [Held by provider] furosemide, 40 mg, oral, Daily  metoprolol succinate XL, 50 mg, oral, Daily  perflutren lipid microspheres, 0.5-10 mL of dilution, intravenous, Once in imaging  sacubitriL-valsartan, 1 tablet, oral, BID  spironolactone, 12.5 mg, oral, Daily  vancomycin, 1,000 mg, intravenous, q12h      Continuous Infusions:    PRN Meds: PRN medications: acetaminophen **OR** acetaminophen **OR** acetaminophen, polyethylene glycol, vancomycin    Allergies as of 11/21/2024 - Reviewed 11/21/2024   Allergen Reaction Noted    Acetaminophen-codeine Hives 11/21/2024    Codeine Unknown 03/29/2010    Penicillins Unknown 03/29/2010            Objective   PHYSICAL EXAM:  Physical Exam Performed:  Vitals:    11/23/24 0700   BP: 101/54   Pulse: 88   Resp: 18   Temp: 36.8 °C (98.2 °F)   SpO2: 94%     Body mass index is 30.52 kg/m².    Patient is AOx3 and in no acute distress. Patient is alert and cooperative. Sitting comfortably in bed with dressing clean, dry and intact.     Vascular: 2/4 due to edema DP/PT pulses B/L. Moderate pitting edema noted B/L. Hair growth absent B/L. CFT<5 to B/L hallux. Temperature is warm to cool from tibial tuberosity to distal digits B/L. No lymphatic streaking noted B/L.    Musculoskeletal: Gross active and passive ROM intact to age and activity level. Moves all extremities spontaneously.  Palpation of ROSELIA blister area left foot as well as dorsal deroofed bulla area left second digit.      Neurological: Intact light touch sensation B/L. Pain stimuli intact B/L. Denies any numbness, burning or tingling.    Dermatologic: Nails 1-5 are thickened, elongated, discolored with subungual debris B/L. Skin appears diffusely xerotic B/L. Web spaces 1-4 B/L are clean, dry and intact. No rashes or nodules noted B/L. No hyperkeratotic tissue noted B/L.     Ulceration noted to the medial  aspect of the left hallux with serous fluid.  Deroofed bulla noted to dorsal aspect left second digit with intact epidermal skin.  No local signs of infection noted.  No erythema and edema to ROSELIA area.  No palpable fluctuance noted after blister was drained.      LABS:   Results for orders placed or performed during the hospital encounter of 11/21/24 (from the past 24 hours)   SST TOP   Result Value Ref Range    Extra Tube Hold for add-ons.    Basic Metabolic Panel   Result Value Ref Range    Glucose 90 74 - 99 mg/dL    Sodium 139 136 - 145 mmol/L    Potassium 3.9 3.5 - 5.3 mmol/L    Chloride 96 (L) 98 - 107 mmol/L    Bicarbonate 39 (H) 21 - 32 mmol/L    Anion Gap 8 (L) 10 - 20 mmol/L    Urea Nitrogen 23 6 - 23 mg/dL    Creatinine 1.05 0.50 - 1.05 mg/dL    eGFR 58 (L) >60 mL/min/1.73m*2    Calcium 8.3 (L) 8.6 - 10.3 mg/dL   CBC and Auto Differential   Result Value Ref Range    WBC 5.3 4.4 - 11.3 x10*3/uL    nRBC 0.0 0.0 - 0.0 /100 WBCs    RBC 4.84 4.00 - 5.20 x10*6/uL    Hemoglobin 14.6 12.0 - 16.0 g/dL    Hematocrit 46.4 (H) 36.0 - 46.0 %    MCV 96 80 - 100 fL    MCH 30.2 26.0 - 34.0 pg    MCHC 31.5 (L) 32.0 - 36.0 g/dL    RDW 13.6 11.5 - 14.5 %    Platelets 171 150 - 450 x10*3/uL    Neutrophils % 71.9 40.0 - 80.0 %    Immature Granulocytes %, Automated 0.2 0.0 - 0.9 %    Lymphocytes % 13.7 13.0 - 44.0 %    Monocytes % 10.6 2.0 - 10.0 %    Eosinophils % 2.8 0.0 - 6.0 %    Basophils % 0.8 0.0 - 2.0 %    Neutrophils Absolute 3.79 1.20 - 7.70 x10*3/uL    Immature Granulocytes Absolute, Automated 0.01 0.00 - 0.70 x10*3/uL    Lymphocytes Absolute 0.72 (L) 1.20 - 4.80 x10*3/uL    Monocytes Absolute 0.56 0.10 - 1.00 x10*3/uL    Eosinophils Absolute 0.15 0.00 - 0.70 x10*3/uL    Basophils Absolute 0.04 0.00 - 0.10 x10*3/uL   Magnesium   Result Value Ref Range    Magnesium 1.83 1.60 - 2.40 mg/dL   Vancomycin   Result Value Ref Range    Vancomycin 7.7 5.0 - 20.0 ug/mL   ECG 12 Lead   Result Value Ref Range    Ventricular  "Rate 84 BPM    Atrial Rate 84 BPM    ME Interval 170 ms    QRS Duration 78 ms    QT Interval 426 ms    QTC Calculation(Bazett) 503 ms    P Axis 74 degrees    R Axis 67 degrees    T Axis 189 degrees    QRS Count 14 beats    Q Onset 222 ms    P Onset 137 ms    P Offset 184 ms    T Offset 435 ms    QTC Fredericia 476 ms   Transthoracic Echo (TTE) Complete   Result Value Ref Range    BSA 1.95 m2      Lab Results   Component Value Date    HGBA1C 6.2 01/10/2021      No results found for: \"CRP\"   No results found for: \"SEDRATE\"     Results from last 7 days   Lab Units 11/23/24 0622   WBC AUTO x10*3/uL 5.3   RBC AUTO x10*6/uL 4.84   HEMOGLOBIN g/dL 14.6   HEMATOCRIT % 46.4*     Results from last 7 days   Lab Units 11/23/24  0622 11/22/24  0429 11/21/24  1426   SODIUM mmol/L 139   < > 139   POTASSIUM mmol/L 3.9   < > 4.8   CHLORIDE mmol/L 96*   < > 100   CO2 mmol/L 39*   < > 35*   BUN mg/dL 23   < > 16   CREATININE mg/dL 1.05   < > 0.91   CALCIUM mg/dL 8.3*   < > 9.0   MAGNESIUM mg/dL 1.83   < > 1.89   BILIRUBIN TOTAL mg/dL  --   --  0.6   ALT U/L  --   --  10   AST U/L  --   --  11    < > = values in this interval not displayed.           IMAGING REVIEW:  ECG 12 Lead    Result Date: 11/23/2024  Normal sinus rhythm T wave abnormality, consider inferior ischemia T wave abnormality, consider anterolateral ischemia Prolonged QT Abnormal ECG When compared with ECG of 22-NOV-2024 06:57, (unconfirmed) T wave inversion now evident in Anterolateral leads QT has lengthened    ECG 12 lead    Result Date: 11/22/2024  Normal sinus rhythm Possible Left atrial enlargement Borderline ECG When compared with ECG of 15-YOLIE-2021 12:44, Premature ventricular complexes are no longer Present Nonspecific T wave abnormality no longer evident in Inferior leads Nonspecific T wave abnormality, improved in Anterior leads See ED provider note for full interpretation and clinical correlation Confirmed by Cristy Tavarez ([id number]) on 11/22/2024 8:26:49 " PM    Cardiac Catheterization Procedure    Result Date: 11/22/2024   [facility], Cath Lab        [address] [place] [address] Cardiovascular Catheterization Report Patient Name:      JASON SCHAFER         Performing Physician:  Rosalio Lundberg MD Study Date:        11/22/2024         Verifying Physician:   Rosalio Lundberg MD MRN/PID:           [id number]           Cardiologist/Co-Scrub: Accession#:        [id number]       Ordering Provider:     [id number] CANDELARIA RANDALL Date of Birth/Age: 1955 / 69      Cardiologist:                    years Gender:            F                  Fellow: Encounter#:        [id number]         Surgeon:  Study:            Coronary Arteriogram Additional Study: Left Heart Cath  Indications: JASON SCHAFER is a 70 year old female who presents with dyslipidemia, hypertension, chronic pulmonary disease, Smoker, CHF and a chest pain assessment of typical angina. NSTE - ACS.  Procedure Description: After infiltration with 2% Lidocaine, the right radial artery was cannulated with a modified Seldinger technique. Subsequently a 5/6 Slender sheath was placed in the right radial artery. Multiple injections of contrast were made into the left and right coronary arteries with angiograms recorded in multiple projections. Retrograde left heart catheterizion was accomplished with a 5 Fr. pigtail catheter. A single plane left ventriculogram was recorded in the 30 degree PULIDO projection. The contrast dose was 20 ml injected at 10 ml/sec. The catheter was then withdrawn across the aortic valve under continuous pressure monitoring and removed.  Coronary Angiography: The coronary circulation is right dominant.  Left Main Coronary Artery: The left main coronary artery is free of atherosclerotic disease.  Left  Anterior Descending Coronary Artery Distribution: The Left Anterior Descending artery is a medium-sized vessel. There is 10-30% stenosis in the mid left anterior descending artery. Presents luminal irregularities.  Circumflex Coronary Artery Distribution: The Left Circumflex artery is a medium-sized vessel. Is free of atherosclerotic disease.  Right Coronary Artery Distribution: The Right Coronary Artery is a large vessel. Presents luminal irregularities. There is 50% stenosis in the the Posterior Lateral Branch Right Coronary Artery.  Left Ventriculography: The estimated left ventricular ejection fraction is abnormal at 40%. There is apical dyskinesis noted. The above is consistent with Takotsubo syndrome, stress-induced cardiomyopathy.  Hemo Personnel: +---------------+---------+ Name           Duty      +---------------+---------+ Wesley LundbergROC MD 1 +---------------+---------+  Hemodynamic Pressures:  +----+---------------+----------+-------------+--------------+-------+---------+ Site   Date Time   Phase NameSystolic mmHgDiastolic mmHgED mmHgMean mmHg +----+---------------+----------+-------------+--------------+-------+---------+   AO     11/22/2024  AIR REST          109            81              95         12:41:13 PM                                                      +----+---------------+----------+-------------+--------------+-------+---------+   LV     11/22/2024  AIR REST          101             0     40                  12:44:06 PM                                                      +----+---------------+----------+-------------+--------------+-------+---------+  Cardiac Cath Post Procedure Notes: Post Procedure Diagnosis: Mild CAD. Blood Loss:               Estimated blood loss during the procedure was 3 mls. Specimens Removed:        Number of specimen(s) removed: none.  ____________________________________________________________________________________ CONCLUSIONS:  1. Left Main Coronary Artery: This artery is normal.  2. Left Anterior Descending Artery: presents luminal irregularities.  3. Mid LAD Lesion: The percent stenosis is 10-30%.  4. Circumflex Coronary Artery: normal.  5. Right Coronary Artery: presents luminal irregularities.  6. Posterior Lateral Branch RCA Lesion: The percent stenosis is 50%.  7. Mild coronary disease.  8. Apical dyskinesis with ejection fraction of 40% consistent with Takotsubo syndrome, stress-induced cardiomyopathy.  9. The Left Ventricular Ejection Fraction is 40%. 10. LV: apical dyskinesis. 11. LV: The above is consistent with Takotsubo syndrome, stress-induced cardiomyopathy. ICD 10 Codes: Non ST elevation (NSTEMI) myocardial infarction-I21.4  CPT Codes: Left Heart Cath (visualization of coronaries) and LV-51136; Moderate Sedation Services 1st additional 15 minutes patient >5 years-[id number]; Moderate Sedation Services 2nd additional 15 minutes patient >5 years-[id number]  [id number] Wesley Lundberg MD Performing Physician Electronically signed by [id number] Lundberg MD on 11/22/2024 at 12:57:42 PM  ** Final **     ECG 12 Lead    Result Date: 11/22/2024  Sinus tachycardia Nonspecific T wave abnormality Abnormal ECG When compared with ECG of 21-NOV-2024 14:15, (unconfirmed) Nonspecific T wave abnormality now evident in Inferior leads Nonspecific T wave abnormality now evident in Lateral leads    XR chest 1 view    Result Date: 11/21/2024  STUDY: Chest Radiograph;  11/21/2024 14:49 INDICATION: Shortness of breath. COMPARISON: 1/15/2021 XR Chest ACCESSION NUMBER(S): [id number] ORDERING CLINICIAN: RAMIRO MERINO TECHNIQUE:  Frontal chest was obtained at 14:48 hours. FINDINGS: CARDIOMEDIASTINAL SILHOUETTE: Heart is mildly enlarged with pulmonary vascular congestion.  LUNGS: Patchy right basilar infiltrate or atelectasis with small right pleural effusion.  ABDOMEN: No  remarkable upper abdominal findings.  BONES: No acute osseous changes.    Cardiomegaly with pulmonary vascular congestion and small right pleural effusion. Patchy right basilar infiltrate or atelectasis. Signed by Elio Dupree MD            Assessment/Plan   ASSESSMENT & PLAN:    # Blister formation left foot  # CHF exacerbation    - Patient was seen and evaluated; all findings were discussed and all questions were answered to patient's satisfaction.  - Charts, labs, vitals and imaging all reviewed.   -No white count at this time    Plan:  - Abx: Per medicine  - Pain Regimen: Per medicine  -Discussed with patient that the bulla formation was most likely secondary to edema and friction while in shoes.  - Blister of left medial hallux was drained using an 18-gauge needle after the area was prepped using Betadine.  The left first and second digits were then dressed with Xeroform, 4 x 4, Delon and secured with Ace wrap to bilateral lower extremities to decrease edema.  - Dressings may be changed daily and orders placed.  - Patient to follow-up for routine care and wound care in the [facility] building with Dr. Mora within 1 to 2 weeks postdischarge.  -No plan for surgical intervention per the podiatry team.  - Patient to follow-up outpatient.  - Podiatry will sign off on patient at this time.  Please feel free to with any questions or concerns    Thank you for the consult    Yamel Mora DPM    A total of 60 minutes was spent in formulation of this note, review of charts, labs,  imaging with a minimum of 50% of the time spent in face to face with with the patient.  All questions and concerns were answered to the patients satisfaction.     Off note, use of [employer] dictation system was used to dictate this document.  All proper spelling and grammatical errors may not have been corrected prior to final submission.               SIGNATURE: Yamel Mora DPM PATIENT NAME: Sandee Sotelo   DATE: November  23, 2024 MRN: [id number]   TIME: 1:29 PM CONTACT: Haiku message

## 2024-11-23 NOTE — PROGRESS NOTES
[facility] Heart Progress Note           Rounding LEIF/Cardiologist:  Jeramy Munoz, APRN-CNP,     Primary Cardiologist: Dr. Damien Gomes    Date:  11/23/2024  Patient:  Sandee Sotelo  YOB: 1955  MRN:  [id number]   Admit Date:  11/21/2024      SUBJECTIVE:    11/23/2024  Patient is awake alert and oriented x 3.  She states she is starting to feel much better I answered all of her questions she states she normally weighs about 170 pounds about 12 pounds from dry weight  EKG is normal sinus rhythm has ST depression in inferior and anterior lateral  Telemetry is normal sinus rhythm occasional PVC          VITALS:     Vitals:    11/22/24 2354 11/23/24 0324 11/23/24 0543 11/23/24 0700   BP: 99/56 109/60  101/54   Pulse: 93 95  88   Resp: 18 18  18   Temp: 37.2 °C (99 °F) 36.9 °C (98.4 °F)  36.8 °C (98.2 °F)   TempSrc:    Temporal   SpO2: 98% 96%  94%   Weight:   83.2 kg (183 lb 6.8 oz)    Height:           Intake/Output Summary (Last 24 hours) at 11/23/2024 0907  Last data filed at 11/22/2024 2039  Gross per 24 hour   Intake 200 ml   Output 300 ml   Net -100 ml       Wt Readings from Last 4 Encounters:   11/23/24 83.2 kg (183 lb 6.8 oz)   03/12/21 80.7 kg (178 lb)       CURRENT HOSPITAL MEDICATIONS:   aspirin, 81 mg, oral, Daily  atorvastatin, 40 mg, oral, Nightly  empagliflozin, 10 mg, oral, Daily  enoxaparin, 40 mg, subcutaneous, q24h  [START ON 11/24/2024] furosemide, 40 mg, oral, Daily  magnesium sulfate, 2 g, intravenous, Once  metoprolol succinate XL, 50 mg, oral, Daily  perflutren lipid microspheres, 0.5-10 mL of dilution, intravenous, Once in imaging  sacubitriL-valsartan, 1 tablet, oral, BID  spironolactone, 12.5 mg, oral, Daily  vancomycin, 1,000 mg, intravenous, q12h         Current Outpatient Medications   Medication Instructions    aspirin 81 mg, oral, Daily    atorvastatin (LIPITOR) 20 mg, oral, Nightly        PHYSICAL EXAMINATION:   GENERAL:  Well developed, well nourished, in no acute  distress.  CHEST:  Symmetric and nontender.  NEURO/PSYCH:  Alert and oriented times three with approppriate behavior and responses.  NECK:  Supple, no JVD, no bruit.  LUNGS: Bilateral rales  HEART:  Rate and rhythm regular with no evident murmur, no gallop appreciated.        There are no rubs, clicks or heaves.  EXTREMITIES:  Warm with good color, no clubbing or cyanosis.  2+ edema  Right wrist soft and intact no hematoma 0 bruit  PERIPHERAL VASCULAR:  Pulses present and equally palpable; 1+ throughout.      LAB DATA:     CBC:   Results from last 7 days   Lab Units 11/23/24 0622 11/22/24 0429 11/21/24  1426   WBC AUTO x10*3/uL 5.3 6.5 5.0   RBC AUTO x10*6/uL 4.84 5.15 5.22*   HEMOGLOBIN g/dL 14.6 15.6 16.0   HEMATOCRIT % 46.4* 48.5* 49.3*   MCV fL 96 94 94   MCH pg 30.2 30.3 30.7   MCHC g/dL 31.5* 32.2 32.5   RDW % 13.6 14.0 14.0   PLATELETS AUTO x10*3/uL 171 192 186     CMP:    Results from last 7 days   Lab Units 11/23/24  0622 11/22/24 0429 11/21/24  1426   SODIUM mmol/L 139 138 139   POTASSIUM mmol/L 3.9 4.4 4.8   CHLORIDE mmol/L 96* 101 100   CO2 mmol/L 39* 30 35*   BUN mg/dL 23 18 16   CREATININE mg/dL 1.05 0.96 0.91   GLUCOSE mg/dL 90 100* 101*   PROTEIN TOTAL g/dL  --   --  6.4   CALCIUM mg/dL 8.3* 8.8 9.0   BILIRUBIN TOTAL mg/dL  --   --  0.6   ALK PHOS U/L  --   --  51   AST U/L  --   --  11   ALT U/L  --   --  10     BMP:    Results from last 7 days   Lab Units 11/23/24 0622 11/22/24 0429 11/21/24  1426   SODIUM mmol/L 139 138 139   POTASSIUM mmol/L 3.9 4.4 4.8   CHLORIDE mmol/L 96* 101 100   CO2 mmol/L 39* 30 35*   BUN mg/dL 23 18 16   CREATININE mg/dL 1.05 0.96 0.91   CALCIUM mg/dL 8.3* 8.8 9.0   GLUCOSE mg/dL 90 100* 101*     Magnesium:  Results from last 7 days   Lab Units 11/23/24  0622 11/22/24  0429 11/21/24  1426   MAGNESIUM mg/dL 1.83 1.75 1.89     Troponin:    Results from last 7 days   Lab Units 11/22/24  0429 11/21/24  1738 11/21/24  1426   TROPHS ng/L 749* 415* 64*     BNP:   Results  from last 7 days   Lab Units 11/21/24  1426   BNP pg/mL 1,146*     Lipid Panel:  Results from last 7 days   Lab Units 11/22/24  0429   HDL mg/dL 56.0   CHOLESTEROL/HDL RATIO  2.8   VLDL mg/dL 15   TRIGLYCERIDES mg/dL 75   NON HDL CHOL. mg/dL 102        DIAGNOSTIC TESTING:   @No results found for this or any previous visit.    ECG 12 lead    Result Date: 11/22/2024  Normal sinus rhythm Possible Left atrial enlargement Borderline ECG When compared with ECG of 15-YOLIE-2021 12:44, Premature ventricular complexes are no longer Present Nonspecific T wave abnormality no longer evident in Inferior leads Nonspecific T wave abnormality, improved in Anterior leads See ED provider note for full interpretation and clinical correlation Confirmed by Cristy Tavarez ([id number]) on 11/22/2024 8:26:49 PM    Cardiac Catheterization Procedure    Result Date: 11/22/2024   [facility], Cath Lab        [address] [place] [address] Cardiovascular Catheterization Report Patient Name:      JASON SCHAFER         Performing Physician:  Rosalio Lundberg MD Study Date:        11/22/2024         Verifying Physician:   Rosalio Lundberg MD MRN/PID:           [id number]           Cardiologist/Co-Scrub: Accession#:        [id number]       Ordering Provider:     [id number] CANDELARIA RANDALL Date of Birth/Age: 1955 / 69      Cardiologist:                    years Gender:            F                  Fellow: Encounter#:        [id number]         Surgeon:  Study:            Coronary Arteriogram Additional Study: Left Heart Cath  Indications: JASON SCHAFER is a 70 year old female who presents with dyslipidemia, hypertension, chronic pulmonary disease, Smoker, CHF and a chest pain assessment of typical angina. NSTE - ACS.  Procedure Description: After  infiltration with 2% Lidocaine, the right radial artery was cannulated with a modified Seldinger technique. Subsequently a 5/6 Slender sheath was placed in the right radial artery. Multiple injections of contrast were made into the left and right coronary arteries with angiograms recorded in multiple projections. Retrograde left heart catheterizion was accomplished with a 5 Fr. pigtail catheter. A single plane left ventriculogram was recorded in the 30 degree PULIDO projection. The contrast dose was 20 ml injected at 10 ml/sec. The catheter was then withdrawn across the aortic valve under continuous pressure monitoring and removed.  Coronary Angiography: The coronary circulation is right dominant.  Left Main Coronary Artery: The left main coronary artery is free of atherosclerotic disease.  Left Anterior Descending Coronary Artery Distribution: The Left Anterior Descending artery is a medium-sized vessel. There is 10-30% stenosis in the mid left anterior descending artery. Presents luminal irregularities.  Circumflex Coronary Artery Distribution: The Left Circumflex artery is a medium-sized vessel. Is free of atherosclerotic disease.  Right Coronary Artery Distribution: The Right Coronary Artery is a large vessel. Presents luminal irregularities. There is 50% stenosis in the the Posterior Lateral Branch Right Coronary Artery.  Left Ventriculography: The estimated left ventricular ejection fraction is abnormal at 40%. There is apical dyskinesis noted. The above is consistent with Takotsubo syndrome, stress-induced cardiomyopathy.  Hemo Personnel: +---------------+---------+ Name           Duty      +---------------+---------+ Wesley LundbergROC MD 1 +---------------+---------+  Hemodynamic Pressures:  +----+---------------+----------+-------------+--------------+-------+---------+ Site   Date Time   Phase NameSystolic mmHgDiastolic mmHgED mmHgMean mmHg  +----+---------------+----------+-------------+--------------+-------+---------+   AO     11/22/2024  AIR REST          109            81              95         12:41:13 PM                                                      +----+---------------+----------+-------------+--------------+-------+---------+   LV     11/22/2024  AIR REST          101             0     40                  12:44:06 PM                                                      +----+---------------+----------+-------------+--------------+-------+---------+  Cardiac Cath Post Procedure Notes: Post Procedure Diagnosis: Mild CAD. Blood Loss:               Estimated blood loss during the procedure was 3 mls. Specimens Removed:        Number of specimen(s) removed: none. ____________________________________________________________________________________ CONCLUSIONS:  1. Left Main Coronary Artery: This artery is normal.  2. Left Anterior Descending Artery: presents luminal irregularities.  3. Mid LAD Lesion: The percent stenosis is 10-30%.  4. Circumflex Coronary Artery: normal.  5. Right Coronary Artery: presents luminal irregularities.  6. Posterior Lateral Branch RCA Lesion: The percent stenosis is 50%.  7. Mild coronary disease.  8. Apical dyskinesis with ejection fraction of 40% consistent with Takotsubo syndrome, stress-induced cardiomyopathy.  9. The Left Ventricular Ejection Fraction is 40%. 10. LV: apical dyskinesis. 11. LV: The above is consistent with Takotsubo syndrome, stress-induced cardiomyopathy. ICD 10 Codes: Non ST elevation (NSTEMI) myocardial infarction-I21.4  CPT Codes: Left Heart Cath (visualization of coronaries) and LV-[id number]; Moderate Sedation Services 1st additional 15 minutes patient >5 years-[id number]; Moderate Sedation Services 2nd additional 15 minutes patient >5 years-[id number]  [id number] Lundberg MD Performing Physician Electronically signed by Rosalio Lundberg MD on 11/22/2024 at 12:57:42 PM   ** Final **     ECG 12 Lead    Result Date: 11/22/2024  Sinus tachycardia Nonspecific T wave abnormality Abnormal ECG When compared with ECG of 21-NOV-2024 14:15, (unconfirmed) Nonspecific T wave abnormality now evident in Inferior leads Nonspecific T wave abnormality now evident in Lateral leads    XR chest 1 view    Result Date: 11/21/2024  STUDY: Chest Radiograph;  11/21/2024 14:49 INDICATION: Shortness of breath. COMPARISON: 1/15/2021 XR Chest ACCESSION NUMBER(S): [id number] ORDERING CLINICIAN: RAMIRO MERINO TECHNIQUE:  Frontal chest was obtained at 14:48 hours. FINDINGS: CARDIOMEDIASTINAL SILHOUETTE: Heart is mildly enlarged with pulmonary vascular congestion.  LUNGS: Patchy right basilar infiltrate or atelectasis with small right pleural effusion.  ABDOMEN: No remarkable upper abdominal findings.  BONES: No acute osseous changes.    Cardiomegaly with pulmonary vascular congestion and small right pleural effusion. Patchy right basilar infiltrate or atelectasis. Signed by Elio Dupree MD       Cardiac Catheterization Procedure   Final Result      XR chest 1 view   Final Result   Cardiomegaly with pulmonary vascular congestion and small right   pleural effusion.   Patchy right basilar infiltrate or atelectasis.   Signed by Elio Dupree MD      Transthoracic Echo (TTE) Complete    (Results Pending)       No echocardiogram results found for the past 14 days    RADIOLOGY:     Cardiac Catheterization Procedure   Final Result      XR chest 1 view   Final Result   Cardiomegaly with pulmonary vascular congestion and small right   pleural effusion.   Patchy right basilar infiltrate or atelectasis.   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 twice daily  Aldactone 12.5 mg daily  Keep magnesium greater than 2.0  Keep potassium tween 4.0-0.5  Possible discharge home tomorrow    Asa Munoz [facility]      Of note, this documentation is completed using the Dragon Dictation system (voice recognition software). There may be spelling and/or grammatical errors that were not corrected prior to final submission.    Please do not hesitate to call with questions.  Electronically signed by TAI Dick, on 11/23/2024 at 9:07 AM

## 2024-11-23 NOTE — PROGRESS NOTES
Medical Group Progress Note  ASSESSMENT & PLAN:     Acute CHF exacerbation  Takotsubo syndrome  - Previous echo in 2021 with diastolic dysfunction  - Repeat echocardiogram completed with results pending   - Receiving lasix 40 g IV twice daily. Patient with signs of intravascular depletion including bicarbonate 39, SBP readings 90s-low 100s but still with overt fluid overload. Decrease furosemide to 40 mg IV daily today and monitor.   - Strict I's and O's, daily weights, 2 g sodium restriction diet  - Continue GDMT otherwise with Jardiance 10 mg daily, toprol XL 50 mg daily, Entresto 24-26 mg BID, and spironolactone 12.5 mg daily  - Cardiology following, appreciate assistance  - Heart failure navigator  - Monitor hemodynamics closely     Elevated troponin  - Troponin peaked 749  - Cardiology following  - Left heart cath reviewed with mild CAD, EF 40% and findings consistent with Takotsubo  - Med management as above    Elevated D-dimer  - Ordered today per cardiology  - Check doppler US BLE     Bilateral lower extremity cellulitis  - Significant erythema, warmth of the bilateral lower extremities  - Continue vancomycin at this time    Blisters LLE  - Consult podiatry for evaluation       VTE Prophylaxis: Enoxaparin subcutaneous     Disposition/Daily update: Seen by cardiology, went for left heart catheterization today which showed mild coronary artery disease with an EF of 40%.  Findings were consistent with possible Takotsubo syndrome.  Echo result remains pending.  D-dimer slightly elevated, spoke with cardiology who recommends possible CTA chest tomorrow to reduce contrast exposure in a 24-hour period.  Diuresing well with Lasix, continue vancomycin as well for lower extremity cellulitis.      ---Of note, this documentation is completed using the Dragon Dictation system (voice recognition software). There may be spelling and/or grammatical errors that were not corrected prior to final submission---    Chetan STUBBS  MD Teo    SUBJECTIVE     Patient was seen and examined at bedside this morning.  She notes overall doing better. She denies side effects from medications.     OBJECTIVE:     Last Recorded Vitals:  Vitals:    11/22/24 2354 11/23/24 0324 11/23/24 0543 11/23/24 0700   BP: 99/56 109/60  101/54   Pulse: 93 95  88   Resp: 18 18  18   Temp: 37.2 °C (99 °F) 36.9 °C (98.4 °F)  36.8 °C (98.2 °F)   TempSrc:    Temporal   SpO2: 98% 96%  94%   Weight:   83.2 kg (183 lb 6.8 oz)    Height:         Last I/O:  I/O last 3 completed shifts:  In: 200 (2.4 mL/kg) [IV Piggyback:200]  Out: 1800 (21.6 mL/kg) [Urine:1800 (0.6 mL/kg/hr)]  Weight: 83.2 kg     Physical Exam  Vitals reviewed.   Constitutional:       General: She is not in acute distress.     Appearance: Normal appearance. She is not toxic-appearing.   HENT:      Head: Normocephalic and atraumatic.   Eyes:      Extraocular Movements: Extraocular movements intact.      Conjunctiva/sclera: Conjunctivae normal.   Cardiovascular:      Rate and Rhythm: Normal rate and regular rhythm.      Pulses: Normal pulses.      Heart sounds: Normal heart sounds.   Pulmonary:      Effort: Pulmonary effort is normal. No respiratory distress.      Breath sounds: No wheezing.      Comments: Breath sounds are diminished to auscultation bilaterally, but improved from previous exam.  Abdominal:      General: Bowel sounds are normal. There is no distension.      Palpations: Abdomen is soft.      Tenderness: There is no abdominal tenderness. There is no guarding.   Musculoskeletal:         General: Swelling present. Normal range of motion.      Cervical back: Normal range of motion and neck supple.      Right lower leg: Edema present.   Skin:     Findings: Erythema present.      Comments: Lower extremities with continued erythema and warmth on palpation. Large blister medial aspect of left hallux. Digit #3 on left foot with open wound   Neurological:      General: No focal deficit present.       Mental Status: She is alert and oriented to person, place, and time. Mental status is at baseline.     Inpatient Medications:  aspirin, 81 mg, oral, Daily  atorvastatin, 40 mg, oral, Nightly  empagliflozin, 10 mg, oral, Daily  enoxaparin, 40 mg, subcutaneous, q24h  [Held by provider] furosemide, 40 mg, oral, Daily  metoprolol succinate XL, 50 mg, oral, Daily  perflutren lipid microspheres, 0.5-10 mL of dilution, intravenous, Once in imaging  sacubitriL-valsartan, 1 tablet, oral, BID  spironolactone, 12.5 mg, oral, Daily  vancomycin, 1,000 mg, intravenous, q12h    PRN Medications  PRN medications: acetaminophen **OR** acetaminophen **OR** acetaminophen, polyethylene glycol, vancomycin  Continuous Medications:     LABS AND IMAGING:     Labs:  Results from last 7 days   Lab Units 11/23/24 0622 11/22/24 0429 11/21/24  1426   WBC AUTO x10*3/uL 5.3 6.5 5.0   RBC AUTO x10*6/uL 4.84 5.15 5.22*   HEMOGLOBIN g/dL 14.6 15.6 16.0   HEMATOCRIT % 46.4* 48.5* 49.3*   MCV fL 96 94 94   MCH pg 30.2 30.3 30.7   MCHC g/dL 31.5* 32.2 32.5   RDW % 13.6 14.0 14.0   PLATELETS AUTO x10*3/uL 171 192 186     Results from last 7 days   Lab Units 11/23/24 0622 11/22/24 0429 11/21/24  1426   SODIUM mmol/L 139 138 139   POTASSIUM mmol/L 3.9 4.4 4.8   CHLORIDE mmol/L 96* 101 100   CO2 mmol/L 39* 30 35*   BUN mg/dL 23 18 16   CREATININE mg/dL 1.05 0.96 0.91   GLUCOSE mg/dL 90 100* 101*   PROTEIN TOTAL g/dL  --   --  6.4   CALCIUM mg/dL 8.3* 8.8 9.0   BILIRUBIN TOTAL mg/dL  --   --  0.6   ALK PHOS U/L  --   --  51   AST U/L  --   --  11   ALT U/L  --   --  10     Results from last 7 days   Lab Units 11/23/24 0622 11/22/24  0429 11/21/24  1426   MAGNESIUM mg/dL 1.83 1.75 1.89     Results from last 7 days   Lab Units 11/22/24  0429 11/21/24  1738 11/21/24  1426   TROPHS ng/L 749* 415* 64*     Imaging:  ECG 12 Lead  Normal sinus rhythm  T wave abnormality, consider inferior ischemia  T wave abnormality, consider anterolateral ischemia  Prolonged  QT  Abnormal ECG  When compared with ECG of 22-NOV-2024 06:57, (unconfirmed)  T wave inversion now evident in Anterolateral leads  QT has lengthened

## 2024-11-24 ENCOUNTER — APPOINTMENT (OUTPATIENT)
Dept: RADIOLOGY | Facility: HOSPITAL | Age: 69
End: 2024-11-24
Payer: MEDICARE

## 2024-11-24 ENCOUNTER — APPOINTMENT (OUTPATIENT)
Dept: CARDIOLOGY | Facility: HOSPITAL | Age: 69
End: 2024-11-24
Payer: MEDICARE

## 2024-11-24 VITALS
DIASTOLIC BLOOD PRESSURE: 63 MMHG | TEMPERATURE: 98.6 F | WEIGHT: 175.93 LBS | BODY MASS INDEX: 29.31 KG/M2 | RESPIRATION RATE: 20 BRPM | HEIGHT: 65 IN | OXYGEN SATURATION: 100 % | HEART RATE: 79 BPM | SYSTOLIC BLOOD PRESSURE: 126 MMHG

## 2024-11-24 LAB
ALBUMIN SERPL BCP-MCNC: 3.1 G/DL (ref 3.4–5)
ALP SERPL-CCNC: 47 U/L (ref 33–136)
ALT SERPL W P-5'-P-CCNC: 9 U/L (ref 7–45)
ANION GAP SERPL CALC-SCNC: 7 MMOL/L (ref 10–20)
AST SERPL W P-5'-P-CCNC: 11 U/L (ref 9–39)
ATRIAL RATE: 103 BPM
ATRIAL RATE: 80 BPM
ATRIAL RATE: 84 BPM
BILIRUB SERPL-MCNC: 0.4 MG/DL (ref 0–1.2)
BUN SERPL-MCNC: 18 MG/DL (ref 6–23)
CALCIUM SERPL-MCNC: 8.2 MG/DL (ref 8.6–10.3)
CHLORIDE SERPL-SCNC: 94 MMOL/L (ref 98–107)
CO2 SERPL-SCNC: 39 MMOL/L (ref 21–32)
CREAT SERPL-MCNC: 1.03 MG/DL (ref 0.5–1.05)
EGFRCR SERPLBLD CKD-EPI 2021: 59 ML/MIN/1.73M*2
ERYTHROCYTE [DISTWIDTH] IN BLOOD BY AUTOMATED COUNT: 13.6 % (ref 11.5–14.5)
GLUCOSE SERPL-MCNC: 108 MG/DL (ref 74–99)
HCT VFR BLD AUTO: 47.7 % (ref 36–46)
HGB BLD-MCNC: 15 G/DL (ref 12–16)
HOLD SPECIMEN: NORMAL
MAGNESIUM SERPL-MCNC: 2.12 MG/DL (ref 1.6–2.4)
MCH RBC QN AUTO: 30.2 PG (ref 26–34)
MCHC RBC AUTO-ENTMCNC: 31.4 G/DL (ref 32–36)
MCV RBC AUTO: 96 FL (ref 80–100)
NRBC BLD-RTO: 0 /100 WBCS (ref 0–0)
P AXIS: 69 DEGREES
P AXIS: 72 DEGREES
P AXIS: 74 DEGREES
P OFFSET: 184 MS
P OFFSET: 184 MS
P OFFSET: 187 MS
P ONSET: 136 MS
P ONSET: 137 MS
P ONSET: 138 MS
PLATELET # BLD AUTO: 170 X10*3/UL (ref 150–450)
POTASSIUM SERPL-SCNC: 3.9 MMOL/L (ref 3.5–5.3)
PR INTERVAL: 170 MS
PR INTERVAL: 170 MS
PR INTERVAL: 182 MS
PROT SERPL-MCNC: 5.9 G/DL (ref 6.4–8.2)
Q ONSET: 222 MS
Q ONSET: 223 MS
Q ONSET: 227 MS
QRS COUNT: 13 BEATS
QRS COUNT: 14 BEATS
QRS COUNT: 17 BEATS
QRS DURATION: 68 MS
QRS DURATION: 78 MS
QRS DURATION: 86 MS
QT INTERVAL: 336 MS
QT INTERVAL: 418 MS
QT INTERVAL: 426 MS
QTC CALCULATION(BAZETT): 440 MS
QTC CALCULATION(BAZETT): 482 MS
QTC CALCULATION(BAZETT): 503 MS
QTC FREDERICIA: 402 MS
QTC FREDERICIA: 460 MS
QTC FREDERICIA: 476 MS
R AXIS: 61 DEGREES
R AXIS: 67 DEGREES
R AXIS: 76 DEGREES
RBC # BLD AUTO: 4.96 X10*6/UL (ref 4–5.2)
SODIUM SERPL-SCNC: 136 MMOL/L (ref 136–145)
T AXIS: 135 DEGREES
T AXIS: 189 DEGREES
T AXIS: 194 DEGREES
T OFFSET: 391 MS
T OFFSET: 435 MS
T OFFSET: 436 MS
VANCOMYCIN SERPL-MCNC: 20.7 UG/ML (ref 5–20)
VENTRICULAR RATE: 103 BPM
VENTRICULAR RATE: 80 BPM
VENTRICULAR RATE: 84 BPM
WBC # BLD AUTO: 4.8 X10*3/UL (ref 4.4–11.3)

## 2024-11-24 PROCEDURE — 85027 COMPLETE CBC AUTOMATED: CPT | Performed by: INTERNAL MEDICINE

## 2024-11-24 PROCEDURE — 80053 COMPREHEN METABOLIC PANEL: CPT | Performed by: INTERNAL MEDICINE

## 2024-11-24 PROCEDURE — 1200000002 HC GENERAL ROOM WITH TELEMETRY DAILY

## 2024-11-24 PROCEDURE — 2500000001 HC RX 250 WO HCPCS SELF ADMINISTERED DRUGS (ALT 637 FOR MEDICARE OP): Performed by: NURSE PRACTITIONER

## 2024-11-24 PROCEDURE — 2500000004 HC RX 250 GENERAL PHARMACY W/ HCPCS (ALT 636 FOR OP/ED): Performed by: NURSE PRACTITIONER

## 2024-11-24 PROCEDURE — 2500000004 HC RX 250 GENERAL PHARMACY W/ HCPCS (ALT 636 FOR OP/ED)

## 2024-11-24 PROCEDURE — 93005 ELECTROCARDIOGRAM TRACING: CPT

## 2024-11-24 PROCEDURE — 2500000001 HC RX 250 WO HCPCS SELF ADMINISTERED DRUGS (ALT 637 FOR MEDICARE OP): Performed by: STUDENT IN AN ORGANIZED HEALTH CARE EDUCATION/TRAINING PROGRAM

## 2024-11-24 PROCEDURE — 71046 X-RAY EXAM CHEST 2 VIEWS: CPT | Performed by: RADIOLOGY

## 2024-11-24 PROCEDURE — 71046 X-RAY EXAM CHEST 2 VIEWS: CPT

## 2024-11-24 PROCEDURE — 83735 ASSAY OF MAGNESIUM: CPT | Performed by: INTERNAL MEDICINE

## 2024-11-24 PROCEDURE — 93010 ELECTROCARDIOGRAM REPORT: CPT | Performed by: INTERNAL MEDICINE

## 2024-11-24 PROCEDURE — 99232 SBSQ HOSP IP/OBS MODERATE 35: CPT | Performed by: NURSE PRACTITIONER

## 2024-11-24 PROCEDURE — 36415 COLL VENOUS BLD VENIPUNCTURE: CPT | Performed by: INTERNAL MEDICINE

## 2024-11-24 PROCEDURE — P9047 ALBUMIN (HUMAN), 25%, 50ML: HCPCS | Performed by: NURSE PRACTITIONER

## 2024-11-24 PROCEDURE — 80202 ASSAY OF VANCOMYCIN: CPT

## 2024-11-24 PROCEDURE — 99232 SBSQ HOSP IP/OBS MODERATE 35: CPT | Performed by: INTERNAL MEDICINE

## 2024-11-24 PROCEDURE — 2500000004 HC RX 250 GENERAL PHARMACY W/ HCPCS (ALT 636 FOR OP/ED): Performed by: STUDENT IN AN ORGANIZED HEALTH CARE EDUCATION/TRAINING PROGRAM

## 2024-11-24 RX ORDER — FUROSEMIDE 40 MG/1
20 TABLET ORAL
Status: DISCONTINUED | OUTPATIENT
Start: 2024-11-24 | End: 2024-11-25

## 2024-11-24 RX ORDER — ALBUMIN HUMAN 250 G/1000ML
25 SOLUTION INTRAVENOUS EVERY 12 HOURS
Status: COMPLETED | OUTPATIENT
Start: 2024-11-24 | End: 2024-11-24

## 2024-11-24 ASSESSMENT — PAIN - FUNCTIONAL ASSESSMENT
PAIN_FUNCTIONAL_ASSESSMENT: 0-10
PAIN_FUNCTIONAL_ASSESSMENT: 0-10

## 2024-11-24 ASSESSMENT — PAIN SCALES - GENERAL
PAINLEVEL_OUTOF10: 0 - NO PAIN
PAINLEVEL_OUTOF10: 3
PAINLEVEL_OUTOF10: 0 - NO PAIN

## 2024-11-24 ASSESSMENT — PAIN SCALES - WONG BAKER: WONGBAKER_NUMERICALRESPONSE: NO HURT

## 2024-11-24 NOTE — CARE PLAN
The patient's goals for the shift include get rest    The clinical goals for the shift include Patient will remain free from injury throughout shift

## 2024-11-24 NOTE — PROGRESS NOTES
[facility] Heart Progress Note           Rounding LEIF/Cardiologist:  Jeramy Munoz, APRN-CNP,     Primary Cardiologist: Dr. Damien Gomes    Date:  11/24/2024  Patient:  Sandee Sotelo  YOB: 1955  MRN:  [id number]   Admit Date:  11/21/2024      SUBJECTIVE:    11/24/2024  Patient states she is feeling better today I spoke to her at length she still having some episodes where she feels like she is short of breath when she gets up to the bedside commode.  I did discuss with her we will plan to get her possibly home tomorrow  EKG is normal sinus rhythm has ST depression in inferior and anterior lateral no acute changes  Weight down to 79.8 kg    11/23/2024  Patient is awake alert and oriented x 3.  She states she is starting to feel much better I answered all of her questions she states she normally weighs about 170 pounds about 12 pounds from dry weight  EKG is normal sinus rhythm has ST depression in inferior and anterior lateral  Telemetry is normal sinus rhythm occasional PVC          VITALS:     Vitals:    11/23/24 2103 11/23/24 2343 11/24/24 0652 11/24/24 0700   BP: 95/50 94/55  90/50   BP Location: Left arm   Left arm   Patient Position: Lying   Sitting   Pulse: 84 76  80   Resp: 17 18  18   Temp: 36.5 °C (97.7 °F) 36.6 °C (97.9 °F)  37.8 °C (100 °F)   TempSrc: Temporal   Temporal   SpO2: 93% 100%  94%   Weight:   79.8 kg (175 lb 14.8 oz)    Height:           Intake/Output Summary (Last 24 hours) at 11/24/2024 1009  Last data filed at 11/24/2024 0245  Gross per 24 hour   Intake 200 ml   Output 300 ml   Net -100 ml       Wt Readings from Last 4 Encounters:   11/24/24 79.8 kg (175 lb 14.8 oz)   03/12/21 80.7 kg (178 lb)       CURRENT HOSPITAL MEDICATIONS:   albumin human, 25 g, intravenous, q12h  aspirin, 81 mg, oral, Daily  atorvastatin, 40 mg, oral, Nightly  empagliflozin, 10 mg, oral, Daily  enoxaparin, 40 mg, subcutaneous, q24h  [Held by provider] furosemide, 20 mg, oral, BID  metoprolol  succinate XL, 50 mg, oral, Daily  perflutren lipid microspheres, 0.5-10 mL of dilution, intravenous, Once in imaging  sacubitriL-valsartan, 0.5 tablet, oral, BID  spironolactone, 12.5 mg, oral, Daily  [START ON 11/25/2024] vancomycin, 1,500 mg, intravenous, q24h         Current Outpatient Medications   Medication Instructions    aspirin 81 mg, oral, Daily    atorvastatin (LIPITOR) 20 mg, oral, Nightly        PHYSICAL EXAMINATION:   GENERAL:  Well developed, well nourished, in no acute distress.  CHEST:  Symmetric and nontender.  NEURO/PSYCH:  Alert and oriented times three with approppriate behavior and responses.  NECK:  Supple, no JVD, no bruit.  LUNGS: Bilateral rales  HEART:  Rate and rhythm regular with no evident murmur, no gallop appreciated.        There are no rubs, clicks or heaves.  EXTREMITIES:  Warm with good color, no clubbing or cyanosis.  2+ edema  Right wrist soft and intact no hematoma 0 bruit  PERIPHERAL VASCULAR:  Pulses present and equally palpable; 1+ throughout.      LAB DATA:     CBC:   Results from last 7 days   Lab Units 11/24/24  0542 11/23/24  0622 11/22/24 0429   WBC AUTO x10*3/uL 4.8 5.3 6.5   RBC AUTO x10*6/uL 4.96 4.84 5.15   HEMOGLOBIN g/dL 15.0 14.6 15.6   HEMATOCRIT % 47.7* 46.4* 48.5*   MCV fL 96 96 94   MCH pg 30.2 30.2 30.3   MCHC g/dL 31.4* 31.5* 32.2   RDW % 13.6 13.6 14.0   PLATELETS AUTO x10*3/uL 170 171 192     CMP:    Results from last 7 days   Lab Units 11/24/24  0542 11/23/24  0622 11/22/24 0429 11/21/24  1426   SODIUM mmol/L 136 139 138 139   POTASSIUM mmol/L 3.9 3.9 4.4 4.8   CHLORIDE mmol/L 94* 96* 101 100   CO2 mmol/L 39* 39* 30 35*   BUN mg/dL 18 23 18 16   CREATININE mg/dL 1.03 1.05 0.96 0.91   GLUCOSE mg/dL 108* 90 100* 101*   PROTEIN TOTAL g/dL 5.9*  --   --  6.4   CALCIUM mg/dL 8.2* 8.3* 8.8 9.0   BILIRUBIN TOTAL mg/dL 0.4  --   --  0.6   ALK PHOS U/L 47  --   --  51   AST U/L 11  --   --  11   ALT U/L 9  --   --  10     BMP:    Results from last 7 days   Lab  Units 11/24/24  0542 11/23/24  0622 11/22/24  0429   SODIUM mmol/L 136 139 138   POTASSIUM mmol/L 3.9 3.9 4.4   CHLORIDE mmol/L 94* 96* 101   CO2 mmol/L 39* 39* 30   BUN mg/dL 18 23 18   CREATININE mg/dL 1.03 1.05 0.96   CALCIUM mg/dL 8.2* 8.3* 8.8   GLUCOSE mg/dL 108* 90 100*     Magnesium:  Results from last 7 days   Lab Units 11/24/24  0542 11/23/24  0622 11/22/24  0429   MAGNESIUM mg/dL 2.12 1.83 1.75     Troponin:    Results from last 7 days   Lab Units 11/22/24  0429 11/21/24  1738 11/21/24  1426   TROPHS ng/L 749* 415* 64*     BNP:   Results from last 7 days   Lab Units 11/21/24  1426   BNP pg/mL 1,146*     Lipid Panel:  Results from last 7 days   Lab Units 11/22/24  0429   HDL mg/dL 56.0   CHOLESTEROL/HDL RATIO  2.8   VLDL mg/dL 15   TRIGLYCERIDES mg/dL 75   NON HDL CHOL. mg/dL 102        DIAGNOSTIC TESTING:   @No results found for this or any previous visit.    ECG 12 lead    Result Date: 11/22/2024  Normal sinus rhythm Possible Left atrial enlargement Borderline ECG When compared with ECG of 15-YOLIE-2021 12:44, Premature ventricular complexes are no longer Present Nonspecific T wave abnormality no longer evident in Inferior leads Nonspecific T wave abnormality, improved in Anterior leads See ED provider note for full interpretation and clinical correlation Confirmed by Cristy Tavarez ([id number]) on 11/22/2024 8:26:49 PM    Cardiac Catheterization Procedure    Result Date: 11/22/2024   [facility], Cath Lab        [address] [place] [address] Cardiovascular Catheterization Report Patient Name:      JASON SCHAFER         Performing Physician:  Rosalio Lundberg MD Study Date:        11/22/2024         Verifying Physician:   Rosalio Lundberg MD MRN/PID:           [id number]           Cardiologist/Co-Scrub: Accession#:        [id number]       Ordering Provider:      [id number] CANDELARIA RANDALL Date of Birth/Age: 1955 / 69      Cardiologist:                    years Gender:            F                  Fellow: Encounter#:        [id number]         Surgeon:  Study:            Coronary Arteriogram Additional Study: Left Heart Cath  Indications: JASON SCHAFER is a 70 year old female who presents with dyslipidemia, hypertension, chronic pulmonary disease, Smoker, CHF and a chest pain assessment of typical angina. NSTE - ACS.  Procedure Description: After infiltration with 2% Lidocaine, the right radial artery was cannulated with a modified Seldinger technique. Subsequently a 5/6 Slender sheath was placed in the right radial artery. Multiple injections of contrast were made into the left and right coronary arteries with angiograms recorded in multiple projections. Retrograde left heart catheterizion was accomplished with a 5 Fr. pigtail catheter. A single plane left ventriculogram was recorded in the 30 degree PULIDO projection. The contrast dose was 20 ml injected at 10 ml/sec. The catheter was then withdrawn across the aortic valve under continuous pressure monitoring and removed.  Coronary Angiography: The coronary circulation is right dominant.  Left Main Coronary Artery: The left main coronary artery is free of atherosclerotic disease.  Left Anterior Descending Coronary Artery Distribution: The Left Anterior Descending artery is a medium-sized vessel. There is 10-30% stenosis in the mid left anterior descending artery. Presents luminal irregularities.  Circumflex Coronary Artery Distribution: The Left Circumflex artery is a medium-sized vessel. Is free of atherosclerotic disease.  Right Coronary Artery Distribution: The Right Coronary Artery is a large vessel. Presents luminal irregularities. There is 50% stenosis in the the Posterior Lateral Branch Right Coronary Artery.  Left Ventriculography: The estimated left ventricular  ejection fraction is abnormal at 40%. There is apical dyskinesis noted. The above is consistent with Takotsubo syndrome, stress-induced cardiomyopathy.  Hemo Personnel: +---------------+---------+ Name           Duty      +---------------+---------+ Wesley LundbergROC MD 1 +---------------+---------+  Hemodynamic Pressures:  +----+---------------+----------+-------------+--------------+-------+---------+ Site   Date Time   Phase NameSystolic mmHgDiastolic mmHgED mmHgMean mmHg +----+---------------+----------+-------------+--------------+-------+---------+   AO     11/22/2024  AIR REST          109            81              95         12:41:13 PM                                                      +----+---------------+----------+-------------+--------------+-------+---------+   LV     11/22/2024  AIR REST          101             0     40                  12:44:06 PM                                                      +----+---------------+----------+-------------+--------------+-------+---------+  Cardiac Cath Post Procedure Notes: Post Procedure Diagnosis: Mild CAD. Blood Loss:               Estimated blood loss during the procedure was 3 mls. Specimens Removed:        Number of specimen(s) removed: none. ____________________________________________________________________________________ CONCLUSIONS:  1. Left Main Coronary Artery: This artery is normal.  2. Left Anterior Descending Artery: presents luminal irregularities.  3. Mid LAD Lesion: The percent stenosis is 10-30%.  4. Circumflex Coronary Artery: normal.  5. Right Coronary Artery: presents luminal irregularities.  6. Posterior Lateral Branch RCA Lesion: The percent stenosis is 50%.  7. Mild coronary disease.  8. Apical dyskinesis with ejection fraction of 40% consistent with Takotsubo syndrome, stress-induced cardiomyopathy.  9. The Left Ventricular Ejection Fraction is 40%. 10. LV: apical dyskinesis. 11.  LV: The above is consistent with Takotsubo syndrome, stress-induced cardiomyopathy. ICD 10 Codes: Non ST elevation (NSTEMI) myocardial infarction-I21.4  CPT Codes: Left Heart Cath (visualization of coronaries) and LV-57392; Moderate Sedation Services 1st additional 15 minutes patient >5 years-32547; Moderate Sedation Services 2nd additional 15 minutes patient >5 years-[id number]  [id number] Wesley Lundberg MD Performing Physician Electronically signed by [id number] Lundberg MD on 11/22/2024 at 12:57:42 PM  ** Final **     ECG 12 Lead    Result Date: 11/22/2024  Sinus tachycardia Nonspecific T wave abnormality Abnormal ECG When compared with ECG of 21-NOV-2024 14:15, (unconfirmed) Nonspecific T wave abnormality now evident in Inferior leads Nonspecific T wave abnormality now evident in Lateral leads    XR chest 1 view    Result Date: 11/21/2024  STUDY: Chest Radiograph;  11/21/2024 14:49 INDICATION: Shortness of breath. COMPARISON: 1/15/2021 XR Chest ACCESSION NUMBER(S): [id number] ORDERING CLINICIAN: RAMIRO MERINO TECHNIQUE:  Frontal chest was obtained at 14:48 hours. FINDINGS: CARDIOMEDIASTINAL SILHOUETTE: Heart is mildly enlarged with pulmonary vascular congestion.  LUNGS: Patchy right basilar infiltrate or atelectasis with small right pleural effusion.  ABDOMEN: No remarkable upper abdominal findings.  BONES: No acute osseous changes.    Cardiomegaly with pulmonary vascular congestion and small right pleural effusion. Patchy right basilar infiltrate or atelectasis. Signed by Elio Dupree MD       Vascular US lower extremity venous duplex bilateral   Final Result   No sonographic findings of right or left lower extremity deep venous   thrombosis        MACRO:   None        Signed by: Joseph Schoenberger 11/23/2024 6:16 PM   Dictation workstation:   [id number]      Transthoracic Echo (TTE) Complete   Final Result      Cardiac Catheterization Procedure   Final Result      XR chest 1 view   Final Result   Cardiomegaly with  pulmonary vascular congestion and small right   pleural effusion.   Patchy right basilar infiltrate or atelectasis.   Signed by Elio Dupree MD      XR chest 2 views    (Results Pending)       Transthoracic Echo (TTE) Complete    Result Date: 11/23/2024          [place] [id number]  Tel [phone] Fax [phone] TRANSTHORACIC ECHOCARDIOGRAM REPORT Patient Name:       JASON STUBBS GILMAR        Reading Physician:    [id number] Femi Mcclelland MD Study Date:         11/23/2024          Ordering Provider:    [id number] ELIZABETH ALCALA MRN/PID:            [id number]            Fellow: Accession#:         [id number]        Nurse: Date of Birth/Age:  1955 / 69 years Sonographer:          Ramona Calix RDCS Gender Assigned at  F                   Additional Staff: Birth: Height:             165.10 cm           Admit Date:           11/21/2024 Weight:             88.00 kg            Admission Status:     Inpatient -                                                               Routine BSA / BMI:          1.95 m2 / 32.28     Department Location:  [address] [place] [address] Blood Pressure: 141 /79 mmHg Study Type:    TRANSTHORACIC ECHO (TTE) COMPLETE Diagnosis/ICD: Heart failure, unspecified-I50.9 Indication:    New Onset Heart Failure; Takotsubo's Cardiomyopathy (by Cath) CPT Codes:     Echo Complete w Full Doppler-[id number] Patient History: Smoker:            Current. Pertinent History: CHF, Shortness of Breath, Elevated Troponin and Dyspnea. Study Detail: The following Echo studies were performed: 2D, M-Mode, Doppler and               color flow. Technically challenging study due to patient lying in               supine position, prominent lung artifact and poor acoustic               windows.  PHYSICIAN  INTERPRETATION: Left Ventricle: The left ventricular systolic function is normal, with a visually estimated ejection fraction of 60-65%. There is mild to moderate concentric left ventricular hypertrophy. There are no regional wall motion abnormalities. The left ventricular cavity size is normal. There is moderately increased septal and moderately increased posterior left ventricular wall thickness. The interventricular septum is flattened in systole, consistent with right ventricular pressure overload. Spectral Doppler shows a Grade I (impaired relaxation pattern) of left ventricular diastolic filling with normal left atrial filling pressure. Left Atrium: The left atrium is normal in size. Right Ventricle: The right ventricle is moderately enlarged. There is moderately increased right ventriclar wall thickness. There is normal right ventricular global systolic function. Right Atrium: The right atrium is normal in size. Aortic Valve: The aortic valve is trileaflet. The aortic valve dimensionless index is 0.87. There is no evidence of aortic valve regurgitation. The peak instantaneous gradient of the aortic valve is 5 mmHg. The mean gradient of the aortic valve is 3 mmHg. Mitral Valve: The mitral valve is normal in structure. There is trace to mild mitral valve regurgitation. Tricuspid Valve: The tricuspid valve is structurally normal. There is moderate tricuspid regurgitation. The Doppler estimated RVSP is severely elevated at 71.0 mmHg. Pulmonic Valve: The pulmonic valve is structurally normal. There is no indication of pulmonic valve regurgitation. Pericardium: No pericardial effusion noted. Aorta: The aortic root is normal. Pulmonary Artery: The tricuspid regurgitant velocity is 3.74 m/s, and with an estimated right atrial pressure of 15 mmHg, the estimated pulmonary artery pressure is severely elevated with the RVSP at 71.0 mmHg. Systemic Veins: The inferior vena cava appears dilated, with IVC inspiratory  collapse greater than 50%.  CONCLUSIONS:  1. The left ventricular systolic function is normal, with a visually estimated ejection fraction of 60-65%.  2. Spectral Doppler shows a Grade I (impaired relaxation pattern) of left ventricular diastolic filling with normal left atrial filling pressure.  3. Right ventricular pressure overload.  4. There is normal right ventricular global systolic function.  5. There is moderately increased right ventriclar wall thickness.  6. Moderately enlarged right ventricle.  7. Moderate tricuspid regurgitation.  8. Severely elevated right ventricular systolic pressure.  9. Severely elevated pulmonary artery pressure. 10. There is moderately increased septal and moderately increased posterior left ventricular wall thickness. QUANTITATIVE DATA SUMMARY:  2D MEASUREMENTS:            Normal Ranges: LAs:             3.11 cm    (2.7-4.0cm) IVSd:            1.30 cm    (0.6-1.1cm) LVPWd:           1.35 cm    (0.6-1.1cm) LVIDd:           4.53 cm    (3.9-5.9cm) LVIDs:           2.05 cm LV Mass Index:   118.4 g/m2 LV % FS          54.7 %  LA VOLUME:                    Normal Ranges: LA Vol A4C:        45.1 ml    (22+/-6mL/m2) LA Vol A2C:        53.9 ml LA Vol BP:         54.5 ml LA Vol Index A4C:  23.1ml/m2 LA Vol Index A2C:  27.6 ml/m2 LA Vol Index BP:   27.9 ml/m2 LA Area A4C:       18.0 cm2 LA Area A2C:       17.8 cm2 LA Major Axis A4C: 6.1 cm LA Major Axis A2C: 5.0 cm LA Volume Index:   27.0 ml/m2 LA Vol A4C:        44.1 ml LA Vol A2C:        52.0 ml LA Vol Index BSA:  24.6 ml/m2  RA VOLUME BY A/L METHOD:            Normal Ranges: RA Vol A4C:              70.7 ml    (8.3-19.5ml) RA Vol Index A4C:        36.2 ml/m2 RA Area A4C:             21.2 cm2 RA Major Axis A4C:       5.4 cm  LV SYSTOLIC FUNCTION BY 2D PLANIMETRY (MOD):                      Normal Ranges: EF-A4C View:    70 % (>=55%) EF-A2C View:    61 % EF-Biplane:     66 % EF-Visual:      63 % LV EF Reported: 63 %  LV DIASTOLIC FUNCTION:            Normal Ranges: MV Peak E:             0.59 m/s  (0.7-1.2 m/s) MV Peak A:             0.77 m/s  (0.42-0.7 m/s) E/A Ratio:             0.76      (1.0-2.2) MV e'                  0.061 m/s (>8.0) MV lateral e'          0.07 m/s MV medial e'           0.05 m/s E/e' Ratio:            9.63      (<8.0)  MITRAL VALVE:          Normal Ranges: MV DT:        172 msec (150-240msec)  AORTIC VALVE:                     Normal Ranges: AoV Vmax:                1.12 m/s (<=1.7m/s) AoV Peak P.0 mmHg (<20mmHg) AoV Mean PG:             3.0 mmHg (1.7-11.5mmHg) LVOT Max Josemanuel:            0.94 m/s (<=1.1m/s) AoV VTI:                 19.50 cm (18-25cm) LVOT VTI:                17.00 cm AoV Dimensionless Index: 0.87  RIGHT VENTRICLE: RV Basal 5.69 cm RV Mid   5.10 cm RV Major 8.0 cm TAPSE:   13.4 mm RV s'    0.09 m/s  TRICUSPID VALVE/RVSP:          Normal Ranges: Peak TR Velocity:     3.74 m/s RV Syst Pressure:     71 mmHg  (< 30mmHg) IVC Diam:             2.60 cm  PULMONIC VALVE:          Normal Ranges: PV Accel Time:  87 msec  (>120ms) PV Max Josemanuel:     1.0 m/s  (0.6-0.9m/s) PV Max PG:      3.8 mmHg PV Mean P.0 mmHg PV VTI:         17.00 cm  [id number] Femi Mcclelland MD Electronically signed on 2024 at 2:50:01 PM  ** Final **      RADIOLOGY:     Vascular US lower extremity venous duplex bilateral   Final Result   No sonographic findings of right or left lower extremity deep venous   thrombosis        MACRO:   None        Signed by: Joseph Schoenberger 2024 6:16 PM   Dictation workstation:   [id number]      Transthoracic Echo (TTE) Complete   Final Result      Cardiac Catheterization Procedure   Final Result      XR chest 1 view   Final Result   Cardiomegaly with pulmonary vascular congestion and small right   pleural effusion.   Patchy right basilar infiltrate or atelectasis.   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 twice daily  Aldactone 12.5 mg daily  Keep magnesium greater than 2.0  Keep potassium tween 4.0-0.5  Possible discharge home tomorrow    11/24/24  Tele monitoring  GDMT  Aspirin 81 daily  Lipitor 40 mg daily  Jardiance 10 mg daily  Lasix 20 mg IV push every 12  Toprol-XL 50 mg daily  Entresto 24-26 half a tab p.o. twice daily  Aldactone 12.5 mg daily  Albumin 25 g every 12 for 2 doses  Possible discharge home tomorrow    Asa Munoz CNP  [facility]      Of note, this documentation is completed using the Dragon Dictation system (voice recognition software). There may be spelling and/or grammatical errors that were not corrected prior to final submission.    Please do not hesitate to call with questions.  Electronically signed by TAI Dick, on 11/24/2024 at 10:09 AM

## 2024-11-24 NOTE — CARE PLAN
Problem: Pain - Adult  Goal: Verbalizes/displays adequate comfort level or baseline comfort level  Outcome: Progressing     Problem: Discharge Planning  Goal: Discharge to home or other facility with appropriate resources  Outcome: Progressing     Problem: Chronic Conditions and Co-morbidities  Goal: Patient's chronic conditions and co-morbidity symptoms are monitored and maintained or improved  Outcome: Progressing     Problem: Heart Failure  Goal: Improved gas exchange this shift  Outcome: Progressing  Goal: Improved urinary output this shift  Outcome: Progressing  Goal: Reduction in peripheral edema within 24 hours  Outcome: Progressing  Goal: Report improvement of dyspnea/breathlessness this shift  Outcome: Progressing  Goal: Weight from fluid excess reduced over 2-3 days, then stabilize  Outcome: Progressing  Goal: Increase self care and/or family involvement in 24 hours  Outcome: Progressing     Problem: Fall/Injury  Goal: Be free from injury by end of the shift  Outcome: Progressing  Goal: Verbalize understanding of personal risk factors for fall in the hospital  Outcome: Progressing  Goal: Verbalize understanding of risk factor reduction measures to prevent injury from fall in the home  Outcome: Progressing  Goal: Use assistive devices by end of the shift  Outcome: Progressing  Goal: Pace activities to prevent fatigue by end of the shift  Outcome: Progressing   The patient's goals for the shift include rest    The clinical goals for the shift include Patient will remain free from injury throughout shift

## 2024-11-24 NOTE — PROGRESS NOTES
Vancomycin Dosing by Pharmacy- FOLLOW UP    Sandee Sotelo is a 69 y.o. year old female who Pharmacy has been consulted for vancomycin dosing for cellulitis, skin and soft tissue. Based on the patient's indication and renal status this patient is being dosed based on a goal AUC of 400-600.     Renal function is currently stable.    Current vancomycin dose: 1000 mg given every 12 hours    Estimated vancomycin AUC on current dose: 593 mg/L.hr     Visit Vitals  BP 94/55   Pulse 76   Temp 36.6 °C (97.9 °F)   Resp 18        Lab Results   Component Value Date    CREATININE 1.03 2024    CREATININE 1.05 2024    CREATININE 0.96 2024    CREATININE 0.91 2024        Patient weight is as follows:   Vitals:    24 0652   Weight: 79.8 kg (175 lb 14.8 oz)       Cultures:  No results found for the encounter in last 14 days.       I/O last 3 completed shifts:  In: 560 (7 mL/kg) [P.O.:360; IV Piggyback:200]  Out: 600 (7.5 mL/kg) [Urine:600 (0.2 mL/kg/hr)]  Weight: 79.8 kg   I/O during current shift:  No intake/output data recorded.    Temp (24hrs), Av.6 °C (97.8 °F), Min:36.5 °C (97.7 °F), Max:36.6 °C (97.9 °F)      Assessment/Plan    Within goal AUC, however, due to increasing toxicity risk (up to 31%), will decrease dosing to 1500 mg q2h4, starting on  at 0100.    This dosing regimen is predicted by [employer] to result in the following pharmacokinetic parameters:  Regimen: 1500 mg IV every 24 hours.  Start time: 01:42 on 2024  Exposure target: AUC24 (range)400-600 mg/L.hr   FMY56-19: 461 mg/L.hr  AUC24,ss: 452 mg/L.hr  Probability of AUC24 > 400: 77 %  Ctrough,ss: 10.9 mg/L  Probability of Ctrough,ss > 20: 2 %    The next level will be obtained on  at AM labs. May be obtained sooner if clinically indicated.   Will continue to monitor renal function daily while on vancomycin and order serum creatinine at least every 48 hours if not already ordered.  Follow for continued vancomycin needs,  clinical response, and signs/symptoms of toxicity.       Radhika Swift, PharmD

## 2024-11-24 NOTE — PROGRESS NOTES
Medical Group Progress Note  ASSESSMENT & PLAN:     Acute CHF exacerbation  Takotsubo syndrome  - Previous echo in 2021 with diastolic dysfunction  - Repeat echocardiogram completed with results pending   - Receiving lasix 40 IV. Patient with signs of intravascular depletion including bicarbonate 39, SBP readings 90s but still with overt fluid overload. Discussed with cardiology, who plans to dose with albumin and furosemide today. Will monitor with this intervention.   - Strict I's and O's, daily weights, 2 g sodium restriction diet  - Continue GDMT otherwise with Jardiance 10 mg daily, toprol XL 50 mg daily, Entresto 24-26 mg BID, and spironolactone 12.5 mg daily  - Cardiology following, appreciate assistance  - Heart failure navigator  - Monitor hemodynamics closely     Elevated troponin  - Troponin peaked 749  - Cardiology following  - Left heart cath reviewed with mild CAD, EF 40% and findings consistent with Takotsubo  - Med management as above    Elevated D-dimer  - Ordered per cardiology  - Doppler US BLE negative for DVT     Bilateral lower extremity cellulitis  - Significant erythema, warmth of the bilateral lower extremities  - Continue vancomycin at this time    Blisters LLE  - Consulted podiatry for evaluation, appreciate input  - Left medial hallux blister drained and dressings applied with daily changes       VTE Prophylaxis: Enoxaparin subcutaneous     Disposition/Daily update: OK for discharge once fluid status optimized.       ---Of note, this documentation is completed using the Dragon Dictation system (voice recognition software). There may be spelling and/or grammatical errors that were not corrected prior to final submission---    Chetan Bruce MD    SUBJECTIVE     Patient was seen and examined at bedside this morning.  She notes breathing continues to improve. She denies dizziness/lightheadedness.     OBJECTIVE:     Last Recorded Vitals:  Vitals:    11/23/24 2103 11/23/24 2343 11/24/24  0652 11/24/24 0700   BP: 95/50 94/55  90/50   BP Location: Left arm   Left arm   Patient Position: Lying   Sitting   Pulse: 84 76  80   Resp: 17 18  18   Temp: 36.5 °C (97.7 °F) 36.6 °C (97.9 °F)  37.8 °C (100 °F)   TempSrc: Temporal   Temporal   SpO2: 93% 100%  94%   Weight:   79.8 kg (175 lb 14.8 oz)    Height:         Last I/O:  I/O last 3 completed shifts:  In: 560 (7 mL/kg) [P.O.:360; IV Piggyback:200]  Out: 600 (7.5 mL/kg) [Urine:600 (0.2 mL/kg/hr)]  Weight: 79.8 kg     Physical Exam  Vitals reviewed.   Constitutional:       General: She is not in acute distress.     Appearance: Normal appearance. She is not toxic-appearing.   HENT:      Head: Normocephalic and atraumatic.   Eyes:      Extraocular Movements: Extraocular movements intact.      Conjunctiva/sclera: Conjunctivae normal.   Cardiovascular:      Rate and Rhythm: Normal rate and regular rhythm.      Pulses: Normal pulses.      Heart sounds: Normal heart sounds.   Pulmonary:      Effort: Pulmonary effort is normal. No respiratory distress.      Breath sounds: No wheezing.      Comments: Breath sounds are diminished to auscultation bilaterally, but improved from previous exam.  Abdominal:      General: Bowel sounds are normal. There is no distension.      Palpations: Abdomen is soft.      Tenderness: There is no abdominal tenderness. There is no guarding.   Musculoskeletal:         General: Swelling present. Normal range of motion.      Cervical back: Normal range of motion and neck supple.      Right lower leg: Edema present.   Skin:     Findings: Erythema present.      Comments: Lower extremities with continued erythema and warmth on palpation, somewhat improved overall today. Blisters on feet now wrapped.   Neurological:      General: No focal deficit present.      Mental Status: She is alert and oriented to person, place, and time. Mental status is at baseline.     Inpatient Medications:  albumin human, 25 g, intravenous, q12h  aspirin, 81 mg, oral,  Daily  atorvastatin, 40 mg, oral, Nightly  empagliflozin, 10 mg, oral, Daily  enoxaparin, 40 mg, subcutaneous, q24h  [Held by provider] furosemide, 20 mg, oral, BID  metoprolol succinate XL, 50 mg, oral, Daily  perflutren lipid microspheres, 0.5-10 mL of dilution, intravenous, Once in imaging  sacubitriL-valsartan, 0.5 tablet, oral, BID  spironolactone, 12.5 mg, oral, Daily  [START ON 11/25/2024] vancomycin, 1,500 mg, intravenous, q24h    PRN Medications  PRN medications: acetaminophen **OR** acetaminophen **OR** acetaminophen, polyethylene glycol, vancomycin  Continuous Medications:     LABS AND IMAGING:     Labs:  Results from last 7 days   Lab Units 11/24/24  0542 11/23/24  0622 11/22/24 0429   WBC AUTO x10*3/uL 4.8 5.3 6.5   RBC AUTO x10*6/uL 4.96 4.84 5.15   HEMOGLOBIN g/dL 15.0 14.6 15.6   HEMATOCRIT % 47.7* 46.4* 48.5*   MCV fL 96 96 94   MCH pg 30.2 30.2 30.3   MCHC g/dL 31.4* 31.5* 32.2   RDW % 13.6 13.6 14.0   PLATELETS AUTO x10*3/uL 170 171 192     Results from last 7 days   Lab Units 11/24/24  0542 11/23/24  0622 11/22/24  0429 11/21/24  1426   SODIUM mmol/L 136 139 138 139   POTASSIUM mmol/L 3.9 3.9 4.4 4.8   CHLORIDE mmol/L 94* 96* 101 100   CO2 mmol/L 39* 39* 30 35*   BUN mg/dL 18 23 18 16   CREATININE mg/dL 1.03 1.05 0.96 0.91   GLUCOSE mg/dL 108* 90 100* 101*   PROTEIN TOTAL g/dL 5.9*  --   --  6.4   CALCIUM mg/dL 8.2* 8.3* 8.8 9.0   BILIRUBIN TOTAL mg/dL 0.4  --   --  0.6   ALK PHOS U/L 47  --   --  51   AST U/L 11  --   --  11   ALT U/L 9  --   --  10     Results from last 7 days   Lab Units 11/24/24  0542 11/23/24  0622 11/22/24  0429   MAGNESIUM mg/dL 2.12 1.83 1.75     Results from last 7 days   Lab Units 11/22/24  0429 11/21/24  1738 11/21/24  1426   TROPHS ng/L 749* 415* 64*     Imaging:  ECG 12 Lead  Normal sinus rhythm  T wave abnormality, consider inferior ischemia  T wave abnormality, consider anterolateral ischemia  Prolonged QT  Abnormal ECG  When compared with ECG of 23-NOV-2024  06:55, (unconfirmed)  No significant change was found  Confirmed by Damien Gomes ([id number]) on 11/24/2024 10:07:36 AM  ECG 12 Lead  Normal sinus rhythm  T wave abnormality, consider inferior ischemia  T wave abnormality, consider anterolateral ischemia  Prolonged QT  Abnormal ECG  When compared with ECG of 22-NOV-2024 06:57, (unconfirmed)  T wave inversion now evident in Anterolateral leads  QT has lengthened  Confirmed by Damien Gomes ([id number]) on 11/24/2024 10:06:13 AM  ECG 12 Lead  Sinus tachycardia  Nonspecific T wave abnormality  Abnormal ECG  When compared with ECG of 21-NOV-2024 14:15, (unconfirmed)  Nonspecific T wave abnormality now evident in Inferior leads  Nonspecific T wave abnormality now evident in Lateral leads  Confirmed by Damien Gomes ([id number]) on 11/24/2024 9:59:10 AM

## 2024-11-25 ENCOUNTER — APPOINTMENT (OUTPATIENT)
Dept: RADIOLOGY | Facility: HOSPITAL | Age: 69
End: 2024-11-25
Payer: MEDICARE

## 2024-11-25 ENCOUNTER — APPOINTMENT (OUTPATIENT)
Dept: CARDIOLOGY | Facility: HOSPITAL | Age: 69
End: 2024-11-25
Payer: MEDICARE

## 2024-11-25 LAB
ANION GAP SERPL CALC-SCNC: <7 MMOL/L (ref 10–20)
BNP SERPL-MCNC: 896 PG/ML (ref 0–99)
BUN SERPL-MCNC: 15 MG/DL (ref 6–23)
CALCIUM SERPL-MCNC: 8.5 MG/DL (ref 8.6–10.3)
CHLORIDE SERPL-SCNC: 95 MMOL/L (ref 98–107)
CO2 SERPL-SCNC: 40 MMOL/L (ref 21–32)
CREAT SERPL-MCNC: 0.87 MG/DL (ref 0.5–1.05)
EGFRCR SERPLBLD CKD-EPI 2021: 72 ML/MIN/1.73M*2
ERYTHROCYTE [DISTWIDTH] IN BLOOD BY AUTOMATED COUNT: 13.4 % (ref 11.5–14.5)
GLUCOSE SERPL-MCNC: 83 MG/DL (ref 74–99)
HCT VFR BLD AUTO: 45.8 % (ref 36–46)
HGB BLD-MCNC: 14.4 G/DL (ref 12–16)
HOLD SPECIMEN: NORMAL
MAGNESIUM SERPL-MCNC: 1.97 MG/DL (ref 1.6–2.4)
MCH RBC QN AUTO: 30.5 PG (ref 26–34)
MCHC RBC AUTO-ENTMCNC: 31.4 G/DL (ref 32–36)
MCV RBC AUTO: 97 FL (ref 80–100)
NRBC BLD-RTO: 0 /100 WBCS (ref 0–0)
PLATELET # BLD AUTO: 164 X10*3/UL (ref 150–450)
POTASSIUM SERPL-SCNC: 3.9 MMOL/L (ref 3.5–5.3)
RBC # BLD AUTO: 4.72 X10*6/UL (ref 4–5.2)
SODIUM SERPL-SCNC: 137 MMOL/L (ref 136–145)
WBC # BLD AUTO: 4.2 X10*3/UL (ref 4.4–11.3)

## 2024-11-25 PROCEDURE — 83735 ASSAY OF MAGNESIUM: CPT | Performed by: INTERNAL MEDICINE

## 2024-11-25 PROCEDURE — 2500000004 HC RX 250 GENERAL PHARMACY W/ HCPCS (ALT 636 FOR OP/ED): Performed by: STUDENT IN AN ORGANIZED HEALTH CARE EDUCATION/TRAINING PROGRAM

## 2024-11-25 PROCEDURE — 85027 COMPLETE CBC AUTOMATED: CPT | Performed by: INTERNAL MEDICINE

## 2024-11-25 PROCEDURE — 83880 ASSAY OF NATRIURETIC PEPTIDE: CPT | Performed by: INTERNAL MEDICINE

## 2024-11-25 PROCEDURE — 2500000001 HC RX 250 WO HCPCS SELF ADMINISTERED DRUGS (ALT 637 FOR MEDICARE OP): Performed by: STUDENT IN AN ORGANIZED HEALTH CARE EDUCATION/TRAINING PROGRAM

## 2024-11-25 PROCEDURE — 36415 COLL VENOUS BLD VENIPUNCTURE: CPT | Performed by: INTERNAL MEDICINE

## 2024-11-25 PROCEDURE — 2500000001 HC RX 250 WO HCPCS SELF ADMINISTERED DRUGS (ALT 637 FOR MEDICARE OP): Performed by: NURSE PRACTITIONER

## 2024-11-25 PROCEDURE — 1200000002 HC GENERAL ROOM WITH TELEMETRY DAILY

## 2024-11-25 PROCEDURE — 2500000001 HC RX 250 WO HCPCS SELF ADMINISTERED DRUGS (ALT 637 FOR MEDICARE OP): Performed by: INTERNAL MEDICINE

## 2024-11-25 PROCEDURE — 2500000002 HC RX 250 W HCPCS SELF ADMINISTERED DRUGS (ALT 637 FOR MEDICARE OP, ALT 636 FOR OP/ED): Performed by: NURSE PRACTITIONER

## 2024-11-25 PROCEDURE — 71275 CT ANGIOGRAPHY CHEST: CPT

## 2024-11-25 PROCEDURE — 2550000001 HC RX 255 CONTRASTS: Performed by: INTERNAL MEDICINE

## 2024-11-25 PROCEDURE — 2500000004 HC RX 250 GENERAL PHARMACY W/ HCPCS (ALT 636 FOR OP/ED)

## 2024-11-25 PROCEDURE — 80048 BASIC METABOLIC PNL TOTAL CA: CPT | Performed by: INTERNAL MEDICINE

## 2024-11-25 PROCEDURE — 71275 CT ANGIOGRAPHY CHEST: CPT | Performed by: RADIOLOGY

## 2024-11-25 PROCEDURE — RXMED WILLOW AMBULATORY MEDICATION CHARGE

## 2024-11-25 PROCEDURE — 93010 ELECTROCARDIOGRAM REPORT: CPT | Performed by: INTERNAL MEDICINE

## 2024-11-25 PROCEDURE — 99233 SBSQ HOSP IP/OBS HIGH 50: CPT | Performed by: INTERNAL MEDICINE

## 2024-11-25 PROCEDURE — 99232 SBSQ HOSP IP/OBS MODERATE 35: CPT | Performed by: INTERNAL MEDICINE

## 2024-11-25 PROCEDURE — 94760 N-INVAS EAR/PLS OXIMETRY 1: CPT

## 2024-11-25 PROCEDURE — 93005 ELECTROCARDIOGRAM TRACING: CPT

## 2024-11-25 RX ORDER — SPIRONOLACTONE 25 MG/1
12.5 TABLET ORAL DAILY
Qty: 15 TABLET | Refills: 3 | Status: SHIPPED | OUTPATIENT
Start: 2024-11-26 | End: 2025-03-26

## 2024-11-25 RX ORDER — TORSEMIDE 20 MG/1
20 TABLET ORAL 3 TIMES WEEKLY
Qty: 48 TABLET | Refills: 3 | Status: SHIPPED | OUTPATIENT
Start: 2024-11-27 | End: 2026-02-18

## 2024-11-25 RX ORDER — DOXYCYCLINE HYCLATE 100 MG
100 TABLET ORAL EVERY 12 HOURS SCHEDULED
Status: DISCONTINUED | OUTPATIENT
Start: 2024-11-25 | End: 2024-11-26 | Stop reason: HOSPADM

## 2024-11-25 RX ORDER — METOPROLOL SUCCINATE 25 MG/1
25 TABLET, EXTENDED RELEASE ORAL DAILY
Qty: 30 TABLET | Refills: 3 | Status: SHIPPED | OUTPATIENT
Start: 2024-11-26 | End: 2025-03-26

## 2024-11-25 RX ORDER — DOXYCYCLINE 100 MG/1
100 CAPSULE ORAL EVERY 12 HOURS SCHEDULED
Qty: 10 CAPSULE | Refills: 0 | Status: SHIPPED | OUTPATIENT
Start: 2024-11-25 | End: 2024-12-01

## 2024-11-25 RX ORDER — TORSEMIDE 20 MG/1
20 TABLET ORAL 3 TIMES WEEKLY
Status: DISCONTINUED | OUTPATIENT
Start: 2024-11-25 | End: 2024-11-26 | Stop reason: HOSPADM

## 2024-11-25 ASSESSMENT — PAIN SCALES - GENERAL
PAINLEVEL_OUTOF10: 0 - NO PAIN
PAINLEVEL_OUTOF10: 3

## 2024-11-25 ASSESSMENT — COGNITIVE AND FUNCTIONAL STATUS - GENERAL
DAILY ACTIVITIY SCORE: 24
DAILY ACTIVITIY SCORE: 24
MOBILITY SCORE: 24
MOBILITY SCORE: 24

## 2024-11-25 ASSESSMENT — PAIN DESCRIPTION - LOCATION: LOCATION: HEAD

## 2024-11-25 ASSESSMENT — PAIN SCALES - WONG BAKER: WONGBAKER_NUMERICALRESPONSE: NO HURT

## 2024-11-25 NOTE — PROGRESS NOTES
Home Oxygen Evaluation        Date/Time SpO2 Medical Gas Therapy Medical Gas Delivery Method Oxygen L/min Patient is on During the Study Patient Activity During Study    11/25/24 11:21:20 93 %  Supplemental oxygen  Nasal cannula  --  --     11/25/24 1143 93 %  None (Room air)  --  --  At rest     11/25/24 1144 85 %  None (Room air)  --  --  Ambulating     11/25/24 1145 91 %  Supplemental oxygen  Nasal cannula  2 L/min  Ambulating                     Was a Home Oxygen Evaluation Performed? Yes  Based on the Home Oxygen Evaluation, Does the Patient Qualify for Home Oxygen Therapy? Yes       Was the [employer] Notified of Home Oxygen Therapy Needs? Yes    Recommendations:       Recommended Oxygen Dose with Activity is 2 L/min

## 2024-11-25 NOTE — PROGRESS NOTES
[facility] Progress Note           Rounding LEIF/Cardiologist:  Jorge Barnett, APRN-CNP,     Primary Cardiologist: Dr. Damien Gomes    Date:  11/25/2024  Patient:  Sandee Sotelo  YOB: 1955  MRN:  [id number]   Admit Date:  11/21/2024      SUBJECTIVE:    11/25/24  Alert and oriented x 3.  Feels her breathing is much better.  Still has significant lower extremity swelling.  Normal sinus rhythm.    11/24/2024  Patient states she is feeling better today I spoke to her at length she still having some episodes where she feels like she is short of breath when she gets up to the bedside commode.  I did discuss with her we will plan to get her possibly home tomorrow  EKG is normal sinus rhythm has ST depression in inferior and anterior lateral no acute changes  Weight down to 79.8 kg    11/23/2024  Patient is awake alert and oriented x 3.  She states she is starting to feel much better I answered all of her questions she states she normally weighs about 170 pounds about 12 pounds from dry weight  EKG is normal sinus rhythm has ST depression in inferior and anterior lateral  Telemetry is normal sinus rhythm occasional PVC          VITALS:     Vitals:    11/24/24 2145 11/24/24 2300 11/25/24 0437 11/25/24 0744   BP: 126/63 109/55  121/58   BP Location:    Left arm   Patient Position:    Sitting   Pulse: 79 80  82   Resp: 20 20  20   Temp: 37 °C (98.6 °F) 36 °C (96.8 °F)  36.7 °C (98.1 °F)   TempSrc:    Temporal   SpO2: 100% 99%  94%   Weight:   82.5 kg (181 lb 14.1 oz)    Height:           Intake/Output Summary (Last 24 hours) at 11/25/2024 1015  Last data filed at 11/25/2024 0744  Gross per 24 hour   Intake 1040 ml   Output 1000 ml   Net 40 ml       Wt Readings from Last 4 Encounters:   11/25/24 82.5 kg (181 lb 14.1 oz)   03/12/21 80.7 kg (178 lb)       CURRENT HOSPITAL MEDICATIONS:   aspirin, 81 mg, oral, Daily  atorvastatin, 40 mg, oral, Nightly  empagliflozin, 10 mg, oral, Daily  enoxaparin, 40 mg,  subcutaneous, q24h  metoprolol succinate XL, 50 mg, oral, Daily  sacubitriL-valsartan, 0.5 tablet, oral, BID  spironolactone, 12.5 mg, oral, Daily  torsemide, 20 mg, oral, Once per day on Monday Wednesday Friday  vancomycin, 1,500 mg, intravenous, q24h         Current Outpatient Medications   Medication Instructions    aspirin 81 mg, oral, Daily    atorvastatin (LIPITOR) 20 mg, oral, Nightly        PHYSICAL EXAMINATION:   GENERAL:  Well developed, well nourished, in no acute distress.  CHEST:  Symmetric and nontender.  NEURO/PSYCH:  Alert and oriented times three with approppriate behavior and responses.  NECK:  Supple, no JVD, no bruit.  LUNGS: Bilateral rales  HEART:  Rate and rhythm regular with no evident murmur, no gallop appreciated.        There are no rubs, clicks or heaves.  EXTREMITIES:  Warm with good color, no clubbing or cyanosis.  2+ edema  Right wrist soft and intact no hematoma 0 bruit  PERIPHERAL VASCULAR:  Pulses present and equally palpable; 1+ throughout.      LAB DATA:     CBC:   Results from last 7 days   Lab Units 11/25/24  0521 11/24/24  0542 11/23/24  0622   WBC AUTO x10*3/uL 4.2* 4.8 5.3   RBC AUTO x10*6/uL 4.72 4.96 4.84   HEMOGLOBIN g/dL 14.4 15.0 14.6   HEMATOCRIT % 45.8 47.7* 46.4*   MCV fL 97 96 96   MCH pg 30.5 30.2 30.2   MCHC g/dL 31.4* 31.4* 31.5*   RDW % 13.4 13.6 13.6   PLATELETS AUTO x10*3/uL 164 170 171     CMP:    Results from last 7 days   Lab Units 11/25/24  0521 11/24/24  0542 11/23/24  0622 11/22/24  0429 11/21/24  1426   SODIUM mmol/L 137 136 139   < > 139   POTASSIUM mmol/L 3.9 3.9 3.9   < > 4.8   CHLORIDE mmol/L 95* 94* 96*   < > 100   CO2 mmol/L 40* 39* 39*   < > 35*   BUN mg/dL 15 18 23   < > 16   CREATININE mg/dL 0.87 1.03 1.05   < > 0.91   GLUCOSE mg/dL 83 108* 90   < > 101*   PROTEIN TOTAL g/dL  --  5.9*  --   --  6.4   CALCIUM mg/dL 8.5* 8.2* 8.3*   < > 9.0   BILIRUBIN TOTAL mg/dL  --  0.4  --   --  0.6   ALK PHOS U/L  --  47  --   --  51   AST U/L  --  11  --    --  11   ALT U/L  --  9  --   --  10    < > = values in this interval not displayed.     BMP:    Results from last 7 days   Lab Units 11/25/24  0521 11/24/24  0542 11/23/24  0622   SODIUM mmol/L 137 136 139   POTASSIUM mmol/L 3.9 3.9 3.9   CHLORIDE mmol/L 95* 94* 96*   CO2 mmol/L 40* 39* 39*   BUN mg/dL 15 18 23   CREATININE mg/dL 0.87 1.03 1.05   CALCIUM mg/dL 8.5* 8.2* 8.3*   GLUCOSE mg/dL 83 108* 90     Magnesium:  Results from last 7 days   Lab Units 11/25/24  0521 11/24/24  0542 11/23/24  0622   MAGNESIUM mg/dL 1.97 2.12 1.83     Troponin:    Results from last 7 days   Lab Units 11/22/24  0429 11/21/24  1738 11/21/24  1426   TROPHS ng/L 749* 415* 64*     BNP:   Results from last 7 days   Lab Units 11/21/24  1426   BNP pg/mL 1,146*     Lipid Panel:  Results from last 7 days   Lab Units 11/22/24  0429   HDL mg/dL 56.0   CHOLESTEROL/HDL RATIO  2.8   VLDL mg/dL 15   TRIGLYCERIDES mg/dL 75   NON HDL CHOL. mg/dL 102        DIAGNOSTIC TESTING:     ECG 12 lead  Result Date: 11/22/2024    Normal sinus rhythm Possible Left atrial enlargement Borderline ECG When compared with ECG of 15-YOLIE-2021 12:44, Premature ventricular complexes are no longer Present Nonspecific T wave abnormality no longer evident in Inferior leads Nonspecific T wave abnormality, improved in Anterior leads See ED provider note for full interpretation and clinical correlation Confirmed by Cristy Tavarez ([id number]) on 11/22/2024 8:26:49 PM      Cardiac Catheterization Procedure  Result Date: 11/22/2024  [facility], Cath Lab        [address] [place] [address] Cardiovascular     CONCLUSIONS:  1. Left Main Coronary Artery: This artery is normal.  2. Left Anterior Descending Artery: presents luminal irregularities.  3. Mid LAD Lesion: The percent stenosis is 10-30%.  4. Circumflex Coronary Artery: normal.  5. Right Coronary Artery: presents luminal irregularities.  6. Posterior Lateral Branch RCA Lesion: The percent stenosis is  50%.  7. Mild coronary disease.  8. Apical dyskinesis with ejection fraction of 40% consistent with Takotsubo syndrome, stress-induced cardiomyopathy.  9. The Left Ventricular Ejection Fraction is 40%. 10. LV: apical dyskinesis. 11. LV: The above is consistent with Takotsubo syndrome, stress-induced cardiomyopathy. ICD 10 Codes: Non ST elevation (NSTEMI) myocardial infarction-I21.4  CPT Codes: Left Heart Cath (visualization of coronaries) and LV-31546; Moderate Sedation Services 1st additional 15 minutes patient >5 years-[id number]; Moderate Sedation Services 2nd additional 15 minutes patient >5 years-[id number]  [id number] Wesley Lundberg MD Performing Physician Electronically signed by [id number] Lundberg MD on 11/22/2024 at 12:57:42 PM        Transthoracic Echo (TTE) Complete    Result Date: 11/23/2024          [place] [id number]  Tel [phone] Fax [phone] TRANSTHORACIC ECHOCARDIOGRAM REPORT Patient Name:       JASON Tello Physician:    [id number] Mcclelland MD Study Date:         11/23/2024          Ordering Provider:    [id number] ELIZABETH ALCALA MRN/PID:            [id number]            Fellow: Accession#:         [id number]        Nurse: Date of Birth/Age:  1955 / 69 years Sonographer:          Ramona Calix RDCS Gender Assigned at  F                   Additional Staff: Birth: Height:             165.10 cm           Admit Date:           11/21/2024 Weight:             88.00 kg            Admission Status:     Inpatient -                                                               Routine BSA / BMI:          1.95 m2 / 32.28     Department Location:  [address] [place] [address]/ Blood Pressure: 141 /79 mmHg Study Type:    TRANSTHORACIC ECHO (TTE) COMPLETE Diagnosis/ICD: Heart  failure, unspecified-I50.9 Indication:    New Onset Heart Failure; Takotsubo's Cardiomyopathy (by Cath) CPT Codes:     Echo Complete w Full Doppler-[id number] Patient History: Smoker:            Current. Pertinent History: CHF, Shortness of Breath, Elevated Troponin and Dyspnea. Study Detail: The following Echo studies were performed: 2D, M-Mode, Doppler and               color flow. Technically challenging study due to patient lying in               supine position, prominent lung artifact and poor acoustic               windows.  PHYSICIAN INTERPRETATION: Left Ventricle: The left ventricular systolic function is normal, with a visually estimated ejection fraction of 60-65%. There is mild to moderate concentric left ventricular hypertrophy. There are no regional wall motion abnormalities. The left ventricular cavity size is normal. There is moderately increased septal and moderately increased posterior left ventricular wall thickness. The interventricular septum is flattened in systole, consistent with right ventricular pressure overload. Spectral Doppler shows a Grade I (impaired relaxation pattern) of left ventricular diastolic filling with normal left atrial filling pressure. Left Atrium: The left atrium is normal in size. Right Ventricle: The right ventricle is moderately enlarged. There is moderately increased right ventriclar wall thickness. There is normal right ventricular global systolic function. Right Atrium: The right atrium is normal in size. Aortic Valve: The aortic valve is trileaflet. The aortic valve dimensionless index is 0.87. There is no evidence of aortic valve regurgitation. The peak instantaneous gradient of the aortic valve is 5 mmHg. The mean gradient of the aortic valve is 3 mmHg. Mitral Valve: The mitral valve is normal in structure. There is trace to mild mitral valve regurgitation. Tricuspid Valve: The tricuspid valve is structurally normal. There is moderate tricuspid regurgitation. The  Doppler estimated RVSP is severely elevated at 71.0 mmHg. Pulmonic Valve: The pulmonic valve is structurally normal. There is no indication of pulmonic valve regurgitation. Pericardium: No pericardial effusion noted. Aorta: The aortic root is normal. Pulmonary Artery: The tricuspid regurgitant velocity is 3.74 m/s, and with an estimated right atrial pressure of 15 mmHg, the estimated pulmonary artery pressure is severely elevated with the RVSP at 71.0 mmHg. Systemic Veins: The inferior vena cava appears dilated, with IVC inspiratory collapse greater than 50%.  CONCLUSIONS:  1. The left ventricular systolic function is normal, with a visually estimated ejection fraction of 60-65%.  2. Spectral Doppler shows a Grade I (impaired relaxation pattern) of left ventricular diastolic filling with normal left atrial filling pressure.  3. Right ventricular pressure overload.  4. There is normal right ventricular global systolic function.  5. There is moderately increased right ventriclar wall thickness.  6. Moderately enlarged right ventricle.  7. Moderate tricuspid regurgitation.  8. Severely elevated right ventricular systolic pressure.  9. Severely elevated pulmonary artery pressure. 10. There is moderately increased septal and moderately increased posterior left ventricular wall thickness. QUANTITATIVE DATA SUMMARY:  2D MEASUREMENTS:            Normal Ranges: LAs:             3.11 cm    (2.7-4.0cm) IVSd:            1.30 cm    (0.6-1.1cm) LVPWd:           1.35 cm    (0.6-1.1cm) LVIDd:           4.53 cm    (3.9-5.9cm) LVIDs:           2.05 cm LV Mass Index:   118.4 g/m2 LV % FS          54.7 %  LA VOLUME:                    Normal Ranges: LA Vol A4C:        45.1 ml    (22+/-6mL/m2) LA Vol A2C:        53.9 ml LA Vol BP:         54.5 ml LA Vol Index A4C:  23.1ml/m2 LA Vol Index A2C:  27.6 ml/m2 LA Vol Index BP:   27.9 ml/m2 LA Area A4C:       18.0 cm2 LA Area A2C:       17.8 cm2 LA Major Axis A4C: 6.1 cm LA Major Axis A2C: 5.0  cm LA Volume Index:   27.0 ml/m2 LA Vol A4C:        44.1 ml LA Vol A2C:        52.0 ml LA Vol Index BSA:  24.6 ml/m2  RA VOLUME BY A/L METHOD:            Normal Ranges: RA Vol A4C:              70.7 ml    (8.3-19.5ml) RA Vol Index A4C:        36.2 ml/m2 RA Area A4C:             21.2 cm2 RA Major Axis A4C:       5.4 cm  LV SYSTOLIC FUNCTION BY 2D PLANIMETRY (MOD):                      Normal Ranges: EF-A4C View:    70 % (>=55%) EF-A2C View:    61 % EF-Biplane:     66 % EF-Visual:      63 % LV EF Reported: 63 %  LV DIASTOLIC FUNCTION:           Normal Ranges: MV Peak E:             0.59 m/s  (0.7-1.2 m/s) MV Peak A:             0.77 m/s  (0.42-0.7 m/s) E/A Ratio:             0.76      (1.0-2.2) MV e'                  0.061 m/s (>8.0) MV lateral e'          0.07 m/s MV medial e'           0.05 m/s E/e' Ratio:            9.63      (<8.0)  MITRAL VALVE:          Normal Ranges: MV DT:        172 msec (150-240msec)  AORTIC VALVE:                     Normal Ranges: AoV Vmax:                1.12 m/s (<=1.7m/s) AoV Peak P.0 mmHg (<20mmHg) AoV Mean PG:             3.0 mmHg (1.7-11.5mmHg) LVOT Max Josemanuel:            0.94 m/s (<=1.1m/s) AoV VTI:                 19.50 cm (18-25cm) LVOT VTI:                17.00 cm AoV Dimensionless Index: 0.87  RIGHT VENTRICLE: RV Basal 5.69 cm RV Mid   5.10 cm RV Major 8.0 cm TAPSE:   13.4 mm RV s'    0.09 m/s  TRICUSPID VALVE/RVSP:          Normal Ranges: Peak TR Velocity:     3.74 m/s RV Syst Pressure:     71 mmHg  (< 30mmHg) IVC Diam:             2.60 cm  PULMONIC VALVE:          Normal Ranges: PV Accel Time:  87 msec  (>120ms) PV Max Josemanuel:     1.0 m/s  (0.6-0.9m/s) PV Max PG:      3.8 mmHg PV Mean P.0 mmHg PV VTI:         17.00 cm  [id number] Femi Mcclelland MD Electronically signed on 2024 at 2:50:01 PM  ** Final **        RADIOLOGY:     XR chest 2 views   Final Result   Improving findings compared to prior.             MACRO:   None        Signed by: Joseph Schoenberger  11/24/2024 12:00 PM   Dictation workstation:   [id number]      Vascular US lower extremity venous duplex bilateral   Final Result   No sonographic findings of right or left lower extremity deep venous   thrombosis        MACRO:   None        Signed by: Joseph Schoenberger 11/23/2024 6:16 PM   Dictation workstation:   [id number]      Transthoracic Echo (TTE) Complete   Final Result      Cardiac Catheterization Procedure   Final Result      XR chest 1 view   Final Result   Cardiomegaly with pulmonary vascular congestion and small right   pleural effusion.   Patchy right basilar infiltrate or atelectasis.   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 twice daily  Aldactone 12.5 mg daily  Keep magnesium greater than 2.0  Keep potassium tween 4.0-0.5  Possible discharge home tomorrow    11/24/24  Tele monitoring  GDMT  Aspirin 81 daily  Lipitor 40 mg daily  Jardiance 10 mg daily  Lasix 20 mg IV push every 12  Toprol-XL 50 mg daily  Entresto 24-26 half a tab p.o. twice daily  Aldactone 12.5 mg daily  Albumin 25 g every 12 for 2 doses  Possible discharge home tomorrow    Asa Munoz [facility]    11/25/24  Alert and oriented x 3.  Follows commands.  Supplemental O2, wean off O2 and assess for home O2 needs  Monitor electrolytes, keep potassium greater than 4 and  magnesium greater than 2  Continue GDMT for heart failure  Continue diuresis with Demadex 3 times per week  BMP in 1 week  Message sent to schedulers to follow-up with Dr. Eliseo Major to discharge from general cardiology perspective  May benefit from PT/OT evaluation  General Cardiology to sign    Jorge Barnett [facility]  Adult Gerontology Acute Care Nurse Practitioner  [facility] [place]   [facility]  [phone]      Of note, this documentation is completed using the Dragon Dictation system (voice recognition software). There may be spelling and/or grammatical errors that were not corrected prior to final submission.    Please do not hesitate to call with questions.  Electronically signed by Jorge Barnett, MARIO-CNP, on 11/25/2024 at 10:15 AM    I have personally interviewed and examined the patient.   I have personally and independently reviewed labs and diagnostic testing.  I have personally verified the elements of the history and physical listed above and changes, if any, are noted.   I have personally reviewed the assessment and plan as documented by Jorge Barnett, NERY, CNP and concur.    In summary, Mrs. DARYA Sotelo states she is feeling better.  She still notes significant peripheral edema.  Her breathing is improved.  Vital signs are stable.  Ox saturation 94%.  Cardiac rhythm is regular.  Lungs have decreased breath sounds at bases.  2+ bilateral peripheral edema.  Labs are reviewed and show a normal CBC with exception of WBC 4.2.  BMP normal with exception of bicarb 40.  Cardiac cath showed mild to moderate disease without any flow-limiting obstructions.  Apical akinesis and estimated LV ejection action 40%.  Findings consistent with Takotsubo cardiomyopathy.  She will be continued on Jardiance, Entresto, and spironolactone.  Change torsemide to 20 mg 3 days/week.  Okay from a cardiac standpoint to discharge home.  Follow-up in the near future  as scheduled.

## 2024-11-25 NOTE — PROGRESS NOTES
Medical Group Progress Note  ASSESSMENT & PLAN:     Acute CHF exacerbation  Takotsubo syndrome  - Previous echo in 2021 with diastolic dysfunction  - Repeat echocardiogram completed with results pending   - Receiving lasix 40 IV. Patient with signs of intravascular depletion including bicarbonate 39, SBP readings 90s but still with overt fluid overload. Discussed with cardiology, who plans to dose with albumin and furosemide today. Will monitor with this intervention.   - Strict I's and O's, daily weights, 2 g sodium restriction diet  - Continue GDMT otherwise with Jardiance 10 mg daily, toprol XL 50 mg daily, Entresto 24-26 mg BID, and spironolactone 12.5 mg daily  - Cardiology following, appreciate assistance  - Heart failure navigator  - Monitor hemodynamics closely     Elevated troponin  - Troponin peaked 749  - Cardiology following  - Left heart cath reviewed with mild CAD, EF 40% and findings consistent with Takotsubo  - Med management as above    Elevated D-dimer  - Ordered per cardiology  - Doppler US BLE negative for DVT     Bilateral lower extremity cellulitis  - Significant erythema, warmth of the bilateral lower extremities  - Continue vancomycin at this time    Blisters LLE  - Consulted podiatry for evaluation, appreciate input  - Left medial hallux blister drained and dressings applied with daily changes       VTE Prophylaxis: Enoxaparin subcutaneous     Disposition/Daily update: OK for discharge once fluid status optimized.     11/25/24  -cardiology following, they changed to PO diuretic and are okay with discharge.She has improved with IV diuresis, BNP down, cxr improved.   -reviewed TTE, showed normal LVEF 60-65%, G1DD with normal LA size, severe pHTN (RVSP 71) with RV pressure overload, enlarged RV with inc wall thickness,moderate TR. Her LE swelling may be from severe pHTN and RV failure. She does have hx of PE in 2021 but did not tolerate DOAC d/t sig GIB requiring multiple units of blood.  She does still have acute hypoxemic resp failure here, requiring 2L with exertion, had elevated D-dimer with neg BLE Dopplers here, and had elevated troponin as well. Spoke with cardiology attending, will scan for acute PE, and start AC if positive. If neg, would still need V/Q scan as part of pHTN aquino since it seems like group 4 pHTN is possible, which can be done as outpt. Placed referral for pulmonology fu as well for outpt.   -?need for HFrEF GDMT given echo findings, but defer to cardiology. Also not sure of what precipitating event there was for questionable Takotsubo CM.   -changed vancomycin to PO doxycycline, cellulitis improving  -will keep an additional day, discussed with cardiology as well  -dispo is home    Sanjeev Santos MD    SUBJECTIVE     VENUSEON. Still feeling some dyspnea, but better. Ambulating okay. Her swelling and redness are improving. Reluctant to discharge today.     OBJECTIVE:     Last Recorded Vitals:  Vitals:    11/25/24 1121 11/25/24 1143 11/25/24 1144 11/25/24 1145   BP: 106/59      BP Location: Left arm      Patient Position: Sitting      Pulse: 76      Resp: 20      Temp: 36.8 °C (98.2 °F)      TempSrc: Temporal      SpO2: 93% 93% (!) 85% 91%   Weight:       Height:         Last I/O:  I/O last 3 completed shifts:  In: 1240 (15 mL/kg) [P.O.:240; I.V.:100 (1.2 mL/kg); Blood:100; IV Piggyback:800]  Out: 1300 (15.8 mL/kg) [Urine:1300 (0.4 mL/kg/hr)]  Weight: 82.5 kg     Physical Exam  Vitals reviewed.   Constitutional:       General: She is not in acute distress.     Appearance: Normal appearance. She is not toxic-appearing.   HENT:      Head: Normocephalic and atraumatic.   Eyes:      Extraocular Movements: Extraocular movements intact.      Conjunctiva/sclera: Conjunctivae normal.   Cardiovascular:      Rate and Rhythm: Normal rate and regular rhythm.      Pulses: Normal pulses.      Heart sounds: Normal heart sounds.   Pulmonary:      Effort: Pulmonary effort is normal. No respiratory  distress.      Breath sounds: No wheezing.      Comments: Breath sounds are diminished to auscultation bilaterally, but improved from previous exam.  Abdominal:      General: Bowel sounds are normal. There is no distension.      Palpations: Abdomen is soft.      Tenderness: There is no abdominal tenderness. There is no guarding.   Musculoskeletal:         General: Swelling present. Normal range of motion.      Cervical back: Normal range of motion and neck supple.      Right lower leg: Edema present.   Skin:     Findings: Erythema present.      Comments: Lower extremities with continued erythema and warmth on palpation, somewhat improved overall today. Blisters on feet now wrapped.   Neurological:      General: No focal deficit present.      Mental Status: She is alert and oriented to person, place, and time. Mental status is at baseline.     Inpatient Medications:  aspirin, 81 mg, oral, Daily  atorvastatin, 40 mg, oral, Nightly  doxycycline, 100 mg, oral, q12h DANIS  empagliflozin, 10 mg, oral, Daily  enoxaparin, 40 mg, subcutaneous, q24h  metoprolol succinate XL, 50 mg, oral, Daily  sacubitriL-valsartan, 0.5 tablet, oral, BID  spironolactone, 12.5 mg, oral, Daily  torsemide, 20 mg, oral, Once per day on Monday Wednesday Friday    PRN Medications  PRN medications: acetaminophen **OR** acetaminophen **OR** acetaminophen, polyethylene glycol  Continuous Medications:     LABS AND IMAGING:     Labs:  Results from last 7 days   Lab Units 11/25/24  0521 11/24/24  0542 11/23/24  0622   WBC AUTO x10*3/uL 4.2* 4.8 5.3   RBC AUTO x10*6/uL 4.72 4.96 4.84   HEMOGLOBIN g/dL 14.4 15.0 14.6   HEMATOCRIT % 45.8 47.7* 46.4*   MCV fL 97 96 96   MCH pg 30.5 30.2 30.2   MCHC g/dL 31.4* 31.4* 31.5*   RDW % 13.4 13.6 13.6   PLATELETS AUTO x10*3/uL 164 170 171     Results from last 7 days   Lab Units 11/25/24  0521 11/24/24  0542 11/23/24  0622 11/22/24  0429 11/21/24  1426   SODIUM mmol/L 137 136 139   < > 139   POTASSIUM mmol/L 3.9 3.9  3.9   < > 4.8   CHLORIDE mmol/L 95* 94* 96*   < > 100   CO2 mmol/L 40* 39* 39*   < > 35*   BUN mg/dL 15 18 23   < > 16   CREATININE mg/dL 0.87 1.03 1.05   < > 0.91   GLUCOSE mg/dL 83 108* 90   < > 101*   PROTEIN TOTAL g/dL  --  5.9*  --   --  6.4   CALCIUM mg/dL 8.5* 8.2* 8.3*   < > 9.0   BILIRUBIN TOTAL mg/dL  --  0.4  --   --  0.6   ALK PHOS U/L  --  47  --   --  51   AST U/L  --  11  --   --  11   ALT U/L  --  9  --   --  10    < > = values in this interval not displayed.     Results from last 7 days   Lab Units 11/25/24  0521 11/24/24  0542 11/23/24  0622   MAGNESIUM mg/dL 1.97 2.12 1.83     Results from last 7 days   Lab Units 11/22/24  0429 11/21/24  1738 11/21/24  1426   TROPHS ng/L 749* 415* 64*     Imaging:  ECG 12 Lead  Sinus rhythm with Premature supraventricular complexes  Possible Left atrial enlargement  T wave abnormality, consider inferolateral ischemia  Abnormal ECG  When compared with ECG of 24-NOV-2024 06:32,  Premature supraventricular complexes are now Present

## 2024-11-25 NOTE — PROGRESS NOTES
Vancomycin Dosing by Pharmacy- Cessation of Therapy    Consult to pharmacy for vancomycin dosing has been discontinued by the prescriber, pharmacy will sign off at this time.    Please call pharmacy if there are further questions or re-enter a consult if vancomycin is resumed.     Cristy Yepez, [facility]

## 2024-11-25 NOTE — CARE PLAN
The patient's goals for the shift include get rest    The clinical goals for the shift include Patient will remain HDS    Problem: Pain - Adult  Goal: Verbalizes/displays adequate comfort level or baseline comfort level  Outcome: Progressing

## 2024-11-25 NOTE — DISCHARGE INSTRUCTIONS
HEART FAILURE EDUCATION:  1. Weigh yourself daily and record on your weight log.  2. If you gain more than 2 or 3 pounds overnight, call your cardiologist.  3. Follow a low sodium diet. No more than 2000 mg in one day, or more than 650 mg per meal.  4. Limit total fluids to no more than 8 cups (or 2 liters) per day - this includes all fluids (water, coffee, juice, milk, tea, etc.)  5. Monitor your blood pressure daily and record on your weight log.  6. Call to schedule your follow-up appointments when you get home if they were not already scheduled for you.  7. Keep your follow-up appointments! Bring your weight log with you so the doctors can see your weight trend and blood pressure readings.  8. Be sure to  any new prescriptions and take them as directed. If unsure of the medications, be sure to call your cardiologist.  9. Stay as active as you can tolerate.   10. If you notice subtle change of symptoms (slight increase in swelling, slight shortness of breath, a new intolerance to laying flat, a new cough), be sure to call your cardiologist.  11. If you have any questions or concerns or you have not heard back from the cardiologist, feel free to call Aurea Clarke heart failure navigator at [phone].

## 2024-11-25 NOTE — PROGRESS NOTES
11/25/24 1032   Discharge Planning   Living Arrangements Family members   Support Systems Family members   Assistance Needed none   Type of Residence Private residence   Number of Stairs to Enter Residence 0   Number of Stairs Within Residence 0   Do you have animals or pets at home? No   Who is requesting discharge planning? Provider   Home or Post Acute Services None   Expected Discharge Disposition Home   Does the patient need discharge transport arranged? No   Patient Choice   Provider Choice list and CMS website (https://medicare.gov/care-compare#search) for post-acute Quality and Resource Measure Data were provided and reviewed with: Patient   Patient / Family choosing to utilize agency / facility established prior to hospitalization No   Stroke Family Assessment   Stroke Family Assessment Needed No   Intensity of Service   Intensity of Service 0-30 min     Patient admitted from home with bilateral leg swelling, found to be in CHF. Patient receiving diuretics. Plan is home, patient normally independent, lives with her brother. Per patient she declines needing home care. RN Navigator on for Jardiance and Entresto. CT team will continue to follow.

## 2024-11-25 NOTE — CARE PLAN
The patient's goals for the shift include get rest    The clinical goals for the shift include Patient will remain HDS

## 2024-11-25 NOTE — CONSULTS
Heart Failure Nurse Navigator    The role of the HF nurse navigator is to (1) characterize risk profiles of patients with heart failure transitioning from [id number] after hospitalization, (2) recommend interventions to improve care and reduce risks of worse post-hospitalization outcomes. Potential recommendations may touch base on patient/family education, additional consults that may bring value, and appointment scheduling.    Assessment    I met with Sandee Sotelo at the bedside, and my impressions include: Patient provided with heart failure education. Patient verbalized understanding. Patient lives at home with her brother. Prior to hospitalization patient did not have a cardiologist. Patient did have a PCP, Dr. Horner, but has not seen him in 3 years. She was not taking any prescriptions medications. Patient is aware that upon discharge she will have follow up scheduled with cardiology. Attempted to make appointment with patient's PCP but since she has not been seen in 3 years it had to be a new patient appointment and is not scheduled until 2/3/25. Stressed the importance of keeping and going to all follow up appointments. She states her niece will be taking her to follow up appointments. She is also aware that upon discharge she will be prescription medications. Stressed the importance of taking all medications as prescribed. She is a smoker. States she is not exactly sure how much she smokes but she said she did start weaning herself prior to admission. Advised continued smoking cessation. Patient was not on oxygen prior to admission. Did speak with patient about Healthy at Home program and she declined at this time. No questions or concerns at this time. Provided my contact information should any questions or concerns arise.           Patients Cardiologist(s): Dr. Gomes     Patients Primary Care Provider: Dr. Horner     1. Medical Domain  What is the patient's most recent LVEF? 60-65%  HFrEF  "(LVEF <= 40%) Quadruple therapy recommended  HFmrEF (LVEF 41-49%) Quadruple therapy recommended  HFpEF (LVEF >= 50%) Minimum recommendations: SGLT2i and MRA  Is the patient on GDMT for their condition?   ARNI/ACEI/ARB: Yes Sacubitril-valsartan 24-26 mg 0.5 tablet BID  BB: Yes Metoprolol succinate 50 mg daily  MRA: Yes spironolactone 12.5 mg daily  SGLT2i: Yes empagliflozin 10 mg daily  Is the patient prescribed a diuretic? Yes  Demadex 20 mg 3 times week   Does this patient have an implanted device (ie cardioMEMS, ICD, CRT-D)?  Device type: none  Could this patient have advanced heart failure (Stage D heart failure)?: No   If yes, the potential markers of advanced heart failure include: N/A    REFERENCE: Potential markers of advanced HF   Inotrope (dobutamine or milrinone) used during this admission?   LVEF<=25%?   >=2 hospitalizations for ADHF in the last year?   Severe symptoms of HF (fatigue, dyspnea, confusion, edema) despite medical therapy?   Downtitration of GDMT as compared to home medications?   Discontinuation of GDMT because of hypotension or renal intolerance?   Recurrent arrhythmias (AF, VT with ICD shocks)?   Cardiac cachexia (i.e., unintentional weight loss due to HF)?   High-risk biomarker profile (e.g., hyponatremia [Na<135], very elevated BNP, worsening kidney function)   Escalating doses of diuretics or persistent edema despite escalation     2. Mind and Emotion  Does this patient have possible cognitive impairment?: No  Ask the patient to memorize these 3 words: banana, sunrise, chair  Ask the patient to draw a clock with hands pointing at \"20 minutes after 8\"  Ask the patient to recall the 3 words  Score: Add number of words recalled + clock drawing (0 points for any errors, 2 points if correct)  Interpretation: A score of 0-2 suggests cognitive impairment is present, a score of 3-5 suggests cognitive impairment is absent  Does this patient have major depression?: No   Over the last 2 weeks: " Little interest or pleasure in doing things? (Not at all +0, Several days +1, More than half the days +2, Nearly every day +3)  Over the last 2 weeks: Feeling down, depressed or hopeless? (Not at all +0, Several days +1, More than half the days +2, Nearly every day +3)  Score: Add points  Interpretation: A score of 3 or more suggests that major depression is likely.     3. Physical Function  Could this patient be frail?: Yes   Defined by presence of all of these: slowness, weakness, shrinking, inactivity, exhaustion  Is this patient at risk for falling?: Yes   Defined by having experienced a fall in the last 12 months.    4. Social Determinants of Health  Transportation deficits?: No   Lack of insurance?: No   Poor financial resources?: No   Living conditions (homelessness, unstable home)?: No   Poor family/social support?: No   Poor personal care?: No   Food insecurity?: No           Recommendations  Recommend evaluation by physical therapist, nutritionist, and/or Palliative Medicine consultant (Patient may be frail and/or at risk of falling).       Heart Failure Education   - Living With Heart Failure packet provided.  - HF signs and symptoms, heart failure zones and when to call cardiologist.   - Controlling Heart Failure at Home: medication adherence, following up with cardiologist at least once yearly, staying healthy and active, limiting sodium and fluid intake as directed by cardiologist.  - Daily Weight Education

## 2024-11-26 ENCOUNTER — PHARMACY VISIT (OUTPATIENT)
Dept: PHARMACY | Facility: CLINIC | Age: 69
End: 2024-11-26
Payer: COMMERCIAL

## 2024-11-26 ENCOUNTER — APPOINTMENT (OUTPATIENT)
Dept: CARDIOLOGY | Facility: HOSPITAL | Age: 69
End: 2024-11-26
Payer: MEDICARE

## 2024-11-26 VITALS
DIASTOLIC BLOOD PRESSURE: 60 MMHG | HEIGHT: 65 IN | TEMPERATURE: 98.4 F | SYSTOLIC BLOOD PRESSURE: 125 MMHG | BODY MASS INDEX: 30.08 KG/M2 | OXYGEN SATURATION: 97 % | RESPIRATION RATE: 18 BRPM | HEART RATE: 70 BPM | WEIGHT: 180.56 LBS

## 2024-11-26 LAB
ANION GAP SERPL CALC-SCNC: 7 MMOL/L (ref 10–20)
ATRIAL RATE: 75 BPM
ATRIAL RATE: 86 BPM
BUN SERPL-MCNC: 14 MG/DL (ref 6–23)
CALCIUM SERPL-MCNC: 8.8 MG/DL (ref 8.6–10.3)
CHLORIDE SERPL-SCNC: 94 MMOL/L (ref 98–107)
CO2 SERPL-SCNC: 41 MMOL/L (ref 21–32)
CREAT SERPL-MCNC: 0.94 MG/DL (ref 0.5–1.05)
EGFRCR SERPLBLD CKD-EPI 2021: 66 ML/MIN/1.73M*2
GLUCOSE SERPL-MCNC: 94 MG/DL (ref 74–99)
HOLD SPECIMEN: NORMAL
MAGNESIUM SERPL-MCNC: 1.87 MG/DL (ref 1.6–2.4)
P AXIS: 70 DEGREES
P AXIS: 71 DEGREES
P OFFSET: 188 MS
P OFFSET: 188 MS
P ONSET: 133 MS
P ONSET: 139 MS
POTASSIUM SERPL-SCNC: 3.9 MMOL/L (ref 3.5–5.3)
PR INTERVAL: 174 MS
PR INTERVAL: 188 MS
Q ONSET: 226 MS
Q ONSET: 227 MS
QRS COUNT: 12 BEATS
QRS COUNT: 14 BEATS
QRS DURATION: 80 MS
QRS DURATION: 80 MS
QT INTERVAL: 374 MS
QT INTERVAL: 378 MS
QTC CALCULATION(BAZETT): 422 MS
QTC CALCULATION(BAZETT): 447 MS
QTC FREDERICIA: 406 MS
QTC FREDERICIA: 421 MS
R AXIS: 65 DEGREES
R AXIS: 76 DEGREES
SODIUM SERPL-SCNC: 138 MMOL/L (ref 136–145)
T AXIS: 193 DEGREES
T AXIS: 213 DEGREES
T OFFSET: 413 MS
T OFFSET: 416 MS
VENTRICULAR RATE: 75 BPM
VENTRICULAR RATE: 86 BPM

## 2024-11-26 PROCEDURE — 83735 ASSAY OF MAGNESIUM: CPT | Performed by: INTERNAL MEDICINE

## 2024-11-26 PROCEDURE — RXMED WILLOW AMBULATORY MEDICATION CHARGE

## 2024-11-26 PROCEDURE — 2500000001 HC RX 250 WO HCPCS SELF ADMINISTERED DRUGS (ALT 637 FOR MEDICARE OP): Performed by: NURSE PRACTITIONER

## 2024-11-26 PROCEDURE — 80048 BASIC METABOLIC PNL TOTAL CA: CPT | Performed by: INTERNAL MEDICINE

## 2024-11-26 PROCEDURE — 2500000002 HC RX 250 W HCPCS SELF ADMINISTERED DRUGS (ALT 637 FOR MEDICARE OP, ALT 636 FOR OP/ED): Performed by: NURSE PRACTITIONER

## 2024-11-26 PROCEDURE — 36415 COLL VENOUS BLD VENIPUNCTURE: CPT | Performed by: INTERNAL MEDICINE

## 2024-11-26 PROCEDURE — 99239 HOSP IP/OBS DSCHRG MGMT >30: CPT | Performed by: HOSPITALIST

## 2024-11-26 PROCEDURE — 2500000001 HC RX 250 WO HCPCS SELF ADMINISTERED DRUGS (ALT 637 FOR MEDICARE OP): Performed by: STUDENT IN AN ORGANIZED HEALTH CARE EDUCATION/TRAINING PROGRAM

## 2024-11-26 PROCEDURE — 93005 ELECTROCARDIOGRAM TRACING: CPT

## 2024-11-26 PROCEDURE — 2500000001 HC RX 250 WO HCPCS SELF ADMINISTERED DRUGS (ALT 637 FOR MEDICARE OP): Performed by: INTERNAL MEDICINE

## 2024-11-26 PROCEDURE — 93010 ELECTROCARDIOGRAM REPORT: CPT | Performed by: INTERNAL MEDICINE

## 2024-11-26 RX ORDER — ASPIRIN 81 MG/1
81 TABLET ORAL DAILY
Qty: 30 TABLET | Refills: 1 | Status: SHIPPED | OUTPATIENT
Start: 2024-11-26

## 2024-11-26 NOTE — NURSING NOTE
Provided patient with first 30 day supply of Entresto and first 14 day supply of Jardiance. Educated patient on dosing instructions and answered questions. Next fill $46 each. Will place referral for  [facility] [place] [facility] as patient is on a fixed income.     Also delivered Spironolactone, torseminde, Metoprolol and Doxycycline as ordered.

## 2024-11-26 NOTE — DISCHARGE SUMMARY
Discharge Diagnosis  New onset of congestive heart failure and Pulmonary HTN     Discharge Meds     Your medication list        START taking these medications        Instructions Last Dose Given Next Dose Due   aspirin 81 mg EC tablet      Take 1 tablet (81 mg) by mouth once daily.       atorvastatin 20 mg tablet  Commonly known as: Lipitor      Take 1 tablet (20 mg) by mouth once daily at bedtime.       doxycycline 100 mg capsule  Commonly known as: Vibramycin      Take 1 capsule (100 mg) by mouth every 12 hours for 5 days. Take with a full glass of water and do not lie down for at least 30 minutes after.       empagliflozin 10 mg  Commonly known as: Jardiance      Take 1 tablet (10 mg) by mouth once daily.       metoprolol succinate XL 25 mg 24 hr tablet  Commonly known as: Toprol-XL      Take 1 tablet (25 mg) by mouth once daily. Do not crush or chew.       sacubitriL-valsartan 24-26 mg tablet  Commonly known as: Entresto      Take 0.5 tablets by mouth 2 times a day.       spironolactone 25 mg tablet  Commonly known as: Aldactone      Take 0.5 tablets (12.5 mg) by mouth once daily.       torsemide 20 mg tablet  Commonly known as: Demadex  Start taking on: November 27, 2024      Take 1 tablet (20 mg) by mouth 3 times a week.                 Where to Get Your Medications        These medications were sent to [facility]  [address] [place] [id number]      Hours: 9 AM to 5:30 PM Mon-Fri, 9 AM to 1 PM Saturday Phone: [phone]   doxycycline 100 mg capsule  empagliflozin 10 mg  metoprolol succinate XL 25 mg 24 hr tablet  sacubitriL-valsartan 24-26 mg tablet  spironolactone 25 mg tablet  torsemide 20 mg tablet       These medications were sent to [id number] [address] AT [facility] & [address] [address]      Hours: 24-hours Phone: [phone]   atorvastatin 20 mg tablet       Information about where to  get these medications is not yet available    Ask your nurse or doctor about these medications  aspirin 81 mg EC tablet         Test Results Pending At Discharge  Pending Labs       No current pending labs.            Hospital Course  69 year old female admitted with acute hypoxic respiratory failure secondary to cor pulmonale and diastolic CHF exacerbation    -echo with normal EF and severe PTH  -cardiology following, now on GDMT per cardio recs, CT angio negative for PE, Hx of PE in the past but did not tolerate AC due to GIB  -diuresed well and now on PO torsemide  -advised outpatient pulm follow up as well  - Home o2 eval done and will need 2L with activity     BLE LE cellulitis: outpatient wound care follow up, LE doppler negative for acute DVT  -continue doxy for 5 more days    Discharged home in stable condition. Home care and Healthy at home offered however patient declined. Greater than 30 minutes of clinical time spent caring for this patient.       Pertinent Physical Exam At Time of Discharge  Gen: NAD  HEENT: EOM, MMM  CV: RRR, no murmurs rubs or gallops  Resp: Clear to auscultation bilaterally  Abdomen: soft, NT,+BS  LE: LE wrapped     Outpatient Follow-Up  Future Appointments   Date Time Provider Department Center   12/3/2024 10:45 AM Damien Gomes MD [id number] [place]   2/3/2025  2:30 PM Ravi Horner MD [id number] Patricio Manrique MD

## 2024-11-26 NOTE — NURSING NOTE
This nurse introduced self and role to patient, prepared and provided AVS, sat with patient, started and completed discharge education, pt verbalized understanding, without further questions or concerns, agreeable and comfortable with discharge at this time, piv removed with catheter intact, tele removed and returned to desk, pt states she has all of her belongings, all prescriptions were delivered to bedside except for one to be picked up at [facility], pt waiting on family to come get her, encouraged to push call button when ready for transport to take her to facility exit, discharge complete.

## 2024-11-26 NOTE — CARE PLAN
The patient's goals for the shift include get rest    The clinical goals for the shift include free of injury t/o shift    Problem: Pain - Adult  Goal: Verbalizes/displays adequate comfort level or baseline comfort level  Outcome: Adequate for Discharge     Problem: Discharge Planning  Goal: Discharge to home or other facility with appropriate resources  Outcome: Adequate for Discharge     Problem: Chronic Conditions and Co-morbidities  Goal: Patient's chronic conditions and co-morbidity symptoms are monitored and maintained or improved  Outcome: Adequate for Discharge     Problem: Heart Failure  Goal: Improved gas exchange this shift  Outcome: Adequate for Discharge  Goal: Improved urinary output this shift  Outcome: Adequate for Discharge  Goal: Reduction in peripheral edema within 24 hours  Outcome: Adequate for Discharge  Goal: Report improvement of dyspnea/breathlessness this shift  Outcome: Adequate for Discharge  Goal: Weight from fluid excess reduced over 2-3 days, then stabilize  Outcome: Adequate for Discharge  Goal: Increase self care and/or family involvement in 24 hours  Outcome: Adequate for Discharge     Problem: Fall/Injury  Goal: Be free from injury by end of the shift  Outcome: Adequate for Discharge  Goal: Verbalize understanding of personal risk factors for fall in the hospital  Outcome: Adequate for Discharge  Goal: Verbalize understanding of risk factor reduction measures to prevent injury from fall in the home  Outcome: Adequate for Discharge  Goal: Use assistive devices by end of the shift  Outcome: Adequate for Discharge  Goal: Pace activities to prevent fatigue by end of the shift  Outcome: Adequate for Discharge

## 2024-11-26 NOTE — NURSING NOTE
Met with patient for education reinforcement. Discussed CHF, signs and symptoms, and when to call cardiologist. Reinforced the importance of following a Low Sodium Diet and monitoring daily weight, lower leg edema, and shortness of breath. Reviewed importance of taking prescribed medications after discharge. Reinforced importance of following up with cardiologist within a week of discharge. Told patient she has appointment with cardiology on 12/3/24 and with her PCP on 2/3/25. Explained that the PCP appointment is so far out because she has to be treated as a new patient since it has been 3 years since she was last seen Reminded patient that they have my contact information should any questions or concerns arise.

## 2024-11-26 NOTE — CARE PLAN
The patient's goals for the shift include get rest    The clinical goals for the shift include P`atient will remain HDS      Problem: Pain - Adult  Goal: Verbalizes/displays adequate comfort level or baseline comfort level  Outcome: Progressing     Problem: Discharge Planning  Goal: Discharge to home or other facility with appropriate resources  Outcome: Progressing     Problem: Chronic Conditions and Co-morbidities  Goal: Patient's chronic conditions and co-morbidity symptoms are monitored and maintained or improved  Outcome: Progressing     Problem: Heart Failure  Goal: Improved gas exchange this shift  Outcome: Progressing  Goal: Improved urinary output this shift  Outcome: Progressing  Goal: Reduction in peripheral edema within 24 hours  Outcome: Progressing  Goal: Report improvement of dyspnea/breathlessness this shift  Outcome: Progressing  Goal: Weight from fluid excess reduced over 2-3 days, then stabilize  Outcome: Progressing  Goal: Increase self care and/or family involvement in 24 hours  Outcome: Progressing     Problem: Fall/Injury  Goal: Be free from injury by end of the shift  Outcome: Progressing  Goal: Verbalize understanding of personal risk factors for fall in the hospital  Outcome: Progressing  Goal: Verbalize understanding of risk factor reduction measures to prevent injury from fall in the home  Outcome: Progressing  Goal: Use assistive devices by end of the shift  Outcome: Progressing  Goal: Pace activities to prevent fatigue by end of the shift  Outcome: Progressing

## 2024-11-27 ENCOUNTER — PATIENT OUTREACH (OUTPATIENT)
Dept: PRIMARY CARE | Facility: CLINIC | Age: 69
End: 2024-11-27
Payer: MEDICARE

## 2024-11-27 NOTE — PROGRESS NOTES
Discharge Facility:  [facility]   Discharge Diagnosis:  New onset of congestive heart failure and Pulmonary HTN   Admission Date:  11/21/24   Discharge Date:   11/26/24     PCP Appointment Date:  Specialist Appointment Date:   12/3/24  cardio   Hospital Encounter and Summary Linked: Yes    New meds   doxycycline 100 mg capsule  empagliflozin 10 mg  metoprolol succinate XL 25 mg 24 hr tablet  sacubitriL-valsartan 24-26 mg tablet  spironolactone 25 mg tablet  torsemide 20 mg tablet    Unable to reach patient x2 attempts. Voicemail left with call back number.

## 2024-12-02 ENCOUNTER — TELEPHONE (OUTPATIENT)
Dept: INPATIENT UNIT | Facility: HOSPITAL | Age: 69
End: 2024-12-02
Payer: MEDICARE

## 2024-12-02 NOTE — NURSING NOTE
Heart Failure Nurse Navigator Transition of Care Phone Call    The role of the HF nurse navigator is to (1) characterize risk profiles of patients with heart failure transitioning from [id number] after hospitalization, (2) recommend interventions to improve care and reduce risks of worse post-hospitalization outcomes.     Assessment  Call attempted to patient .     HF Symptoms  Chest pain? No  Shortness of breath? none  Orthopnea? No  Paroxysmal nocturnal dyspnea? No  Edema? Yes - getting better everyday   Weight gain >2lbs in 3 days or 5lbs in 1 week? No    Medications  Is the patient prescribed the following medications?  ACEi/ARB/ARNi? Yes  BB? Yes  MRA? Yes  SGLT2i? Yes  Diuretic? Yes  Is the patient adherent to prescribed medications? YES    Management    Is the patient obtaining daily weights? YES  If yes, current weight? 165  Is the patient following diet limitations (2-3g Na, fluid restriction)? YES  Does the patient have a  cardiology follow-up scheduled? YES  If yes, appointment details? 12/3/24  If no, what is the reason? N/A  Does the patient require HF education reinforcement? No  If yes, what was discussed? N/A    Recommendations/Comments:  Spoke with patient who is doing well. States she is weighing herself everyday. States about 4 days ago she was 175 and now she is down to 165. States her swelling is getting better everyday. She is aware of her follow up appointment with cardiology tomorrow and states she is going to try and make it. Stressed the importance of keeping and going to this follow up appointment. She is also aware that she needs to set-up an appointment at the wound center. She states she has all her medications and is taking everything as prescribed. She was set-up with oxygen and is wearing it. No questions or concerns at this time. Reminded patient of my contact information should any questions or concerns arise.

## 2024-12-03 ENCOUNTER — APPOINTMENT (OUTPATIENT)
Dept: CARDIOLOGY | Facility: CLINIC | Age: 69
End: 2024-12-03
Payer: MEDICARE

## 2024-12-09 ENCOUNTER — TELEPHONE (OUTPATIENT)
Dept: CARDIOLOGY | Facility: CLINIC | Age: 69
End: 2024-12-09
Payer: MEDICARE

## 2024-12-09 NOTE — TELEPHONE ENCOUNTER
Received request for prescription refills for patient.   Patient follows with Dr. Damien Gomes     Request is for refill  Is patient currently on medication yes    Last OV   Next OV     Pended for signing and sent to provider     Sent to secretaries to schedule for follow up.

## 2024-12-12 ENCOUNTER — PATIENT OUTREACH (OUTPATIENT)
Dept: PRIMARY CARE | Facility: CLINIC | Age: 69
End: 2024-12-12
Payer: MEDICARE

## 2024-12-12 NOTE — PROGRESS NOTES
Call regarding hospitalization. Pt reports doing well at home. Pt has no concerns at this time. Pt has not had any follow up appts. bu  Cm explained importance of follow up appts.  Pt agreeable. Pcp appt made for 2/25.  Contact info provided for an additional concerns.

## 2024-12-26 ENCOUNTER — PHARMACY VISIT (OUTPATIENT)
Dept: PHARMACY | Facility: CLINIC | Age: 69
End: 2024-12-26
Payer: COMMERCIAL

## 2024-12-26 PROCEDURE — RXMED WILLOW AMBULATORY MEDICATION CHARGE

## 2025-01-02 ENCOUNTER — APPOINTMENT (OUTPATIENT)
Dept: PHARMACY | Facility: HOSPITAL | Age: 70
End: 2025-01-02
Payer: MEDICARE

## 2025-01-17 ENCOUNTER — APPOINTMENT (OUTPATIENT)
Dept: PHARMACY | Facility: HOSPITAL | Age: 70
End: 2025-01-17
Payer: MEDICARE

## 2025-01-17 DIAGNOSIS — I51.81 TAKOTSUBO SYNDROME: ICD-10-CM

## 2025-01-17 DIAGNOSIS — I50.9 NEW ONSET OF CONGESTIVE HEART FAILURE: ICD-10-CM

## 2025-01-17 NOTE — PROGRESS NOTES
Discussed  Patient Assistance program with patient. Likely eligible but unable to currently assist as patient has not seen an outpatient cardiologist on our collaborative practice agreement. Emphasized importance of following up with  outpatient cardiology at which point we can work on follow up and patient assistance.

## 2025-01-20 ENCOUNTER — PHARMACY VISIT (OUTPATIENT)
Dept: PHARMACY | Facility: CLINIC | Age: 70
End: 2025-01-20
Payer: COMMERCIAL

## 2025-01-20 PROCEDURE — RXMED WILLOW AMBULATORY MEDICATION CHARGE

## 2025-02-03 ENCOUNTER — APPOINTMENT (OUTPATIENT)
Dept: PRIMARY CARE | Facility: CLINIC | Age: 70
End: 2025-02-03
Payer: MEDICARE

## 2025-02-14 PROCEDURE — RXMED WILLOW AMBULATORY MEDICATION CHARGE

## 2025-02-15 ENCOUNTER — PHARMACY VISIT (OUTPATIENT)
Dept: PHARMACY | Facility: CLINIC | Age: 70
End: 2025-02-15
Payer: COMMERCIAL

## 2025-03-21 ENCOUNTER — PHARMACY VISIT (OUTPATIENT)
Dept: PHARMACY | Facility: CLINIC | Age: 70
End: 2025-03-21
Payer: COMMERCIAL

## 2025-03-21 DIAGNOSIS — I50.9 NEW ONSET OF CONGESTIVE HEART FAILURE: ICD-10-CM

## 2025-03-21 PROCEDURE — RXMED WILLOW AMBULATORY MEDICATION CHARGE

## 2025-03-21 RX ORDER — SPIRONOLACTONE 25 MG/1
12.5 TABLET ORAL DAILY
Qty: 15 TABLET | Refills: 0 | Status: SHIPPED | OUTPATIENT
Start: 2025-03-21 | End: 2025-04-21

## 2025-03-21 RX ORDER — METOPROLOL SUCCINATE 25 MG/1
25 TABLET, EXTENDED RELEASE ORAL DAILY
Qty: 30 TABLET | Refills: 3 | OUTPATIENT
Start: 2025-03-21 | End: 2025-07-19

## 2025-03-21 RX ORDER — SPIRONOLACTONE 25 MG/1
12.5 TABLET ORAL DAILY
Qty: 15 TABLET | Refills: 3 | OUTPATIENT
Start: 2025-03-21 | End: 2025-07-19

## 2025-03-21 RX ORDER — METOPROLOL SUCCINATE 25 MG/1
25 TABLET, EXTENDED RELEASE ORAL DAILY
Qty: 30 TABLET | Refills: 0 | Status: SHIPPED | OUTPATIENT
Start: 2025-03-21 | End: 2025-04-21

## 2025-03-21 NOTE — TELEPHONE ENCOUNTER
Received request for prescription refills for patient.   Patient follows with Dr. Gomes     Request is for ToprolXL and Aldactone  Is patient currently on medication yes    Last OV 11/22/24 consult with Dr. Gomes   Next OV 12/3/24 no- show     Pended for short supply of both medicines until patient schedules

## 2025-03-22 ENCOUNTER — PHARMACY VISIT (OUTPATIENT)
Dept: PHARMACY | Facility: CLINIC | Age: 70
End: 2025-03-22

## 2025-04-01 ENCOUNTER — APPOINTMENT (OUTPATIENT)
Dept: PRIMARY CARE | Facility: CLINIC | Age: 70
End: 2025-04-01
Payer: MEDICARE

## 2025-04-15 ENCOUNTER — APPOINTMENT (OUTPATIENT)
Dept: CARDIOLOGY | Facility: CLINIC | Age: 70
End: 2025-04-15
Payer: MEDICARE

## 2025-05-06 ENCOUNTER — APPOINTMENT (OUTPATIENT)
Dept: CARDIOLOGY | Facility: CLINIC | Age: 70
End: 2025-05-06
Payer: MEDICARE

## 2025-05-30 ENCOUNTER — APPOINTMENT (OUTPATIENT)
Dept: PRIMARY CARE | Facility: CLINIC | Age: 70
End: 2025-05-30
Payer: MEDICARE

## 2025-05-30 ENCOUNTER — PHARMACY VISIT (OUTPATIENT)
Dept: PHARMACY | Facility: CLINIC | Age: 70
End: 2025-05-30
Payer: COMMERCIAL

## 2025-05-30 VITALS
SYSTOLIC BLOOD PRESSURE: 180 MMHG | DIASTOLIC BLOOD PRESSURE: 89 MMHG | HEIGHT: 63 IN | BODY MASS INDEX: 30.48 KG/M2 | WEIGHT: 172 LBS | TEMPERATURE: 97.1 F | HEART RATE: 106 BPM

## 2025-05-30 DIAGNOSIS — Z11.59 NEED FOR HEPATITIS C SCREENING TEST: ICD-10-CM

## 2025-05-30 DIAGNOSIS — I50.9 NEW ONSET OF CONGESTIVE HEART FAILURE: ICD-10-CM

## 2025-05-30 DIAGNOSIS — Z00.00 ROUTINE GENERAL MEDICAL EXAMINATION AT HEALTH CARE FACILITY: ICD-10-CM

## 2025-05-30 DIAGNOSIS — Z12.31 ENCOUNTER FOR SCREENING MAMMOGRAM FOR BREAST CANCER: ICD-10-CM

## 2025-05-30 DIAGNOSIS — I42.9 CARDIOMYOPATHY, UNSPECIFIED TYPE (MULTI): ICD-10-CM

## 2025-05-30 DIAGNOSIS — Z12.11 COLON CANCER SCREENING: ICD-10-CM

## 2025-05-30 DIAGNOSIS — I25.10 CAD (CORONARY ARTERY DISEASE): ICD-10-CM

## 2025-05-30 DIAGNOSIS — I27.29 RIGHT HEART FAILURE DUE TO PULMONARY HYPERTENSION: ICD-10-CM

## 2025-05-30 DIAGNOSIS — Z11.59 SPECIAL SCREENING EXAMINATION FOR VIRAL DISEASE: ICD-10-CM

## 2025-05-30 DIAGNOSIS — I50.810 RIGHT HEART FAILURE DUE TO PULMONARY HYPERTENSION: ICD-10-CM

## 2025-05-30 DIAGNOSIS — J44.1 CHRONIC OBSTRUCTIVE PULMONARY DISEASE WITH (ACUTE) EXACERBATION (MULTI): Primary | ICD-10-CM

## 2025-05-30 PROBLEM — K26.9 DUODENAL ULCER: Status: ACTIVE | Noted: 2025-05-30

## 2025-05-30 PROBLEM — I26.09 OTHER PULMONARY EMBOLISM WITH ACUTE COR PULMONALE: Status: ACTIVE | Noted: 2025-05-30

## 2025-05-30 PROCEDURE — G0439 PPPS, SUBSEQ VISIT: HCPCS | Performed by: INTERNAL MEDICINE

## 2025-05-30 PROCEDURE — 3008F BODY MASS INDEX DOCD: CPT | Performed by: INTERNAL MEDICINE

## 2025-05-30 PROCEDURE — 1123F ACP DISCUSS/DSCN MKR DOCD: CPT | Performed by: INTERNAL MEDICINE

## 2025-05-30 PROCEDURE — RXMED WILLOW AMBULATORY MEDICATION CHARGE

## 2025-05-30 PROCEDURE — 1159F MED LIST DOCD IN RCRD: CPT | Performed by: INTERNAL MEDICINE

## 2025-05-30 PROCEDURE — 1036F TOBACCO NON-USER: CPT | Performed by: INTERNAL MEDICINE

## 2025-05-30 PROCEDURE — 1170F FXNL STATUS ASSESSED: CPT | Performed by: INTERNAL MEDICINE

## 2025-05-30 RX ORDER — METOPROLOL SUCCINATE 25 MG/1
25 TABLET, EXTENDED RELEASE ORAL DAILY
Qty: 30 TABLET | Refills: 11 | Status: SHIPPED | OUTPATIENT
Start: 2025-05-30 | End: 2026-05-25

## 2025-05-30 RX ORDER — TORSEMIDE 20 MG/1
20 TABLET ORAL 3 TIMES WEEKLY
Qty: 48 TABLET | Refills: 3 | Status: SHIPPED | OUTPATIENT
Start: 2025-05-30 | End: 2026-08-21

## 2025-05-30 RX ORDER — ALBUTEROL SULFATE 90 UG/1
2 INHALANT RESPIRATORY (INHALATION) EVERY 6 HOURS PRN
COMMUNITY

## 2025-05-30 RX ORDER — SPIRONOLACTONE 25 MG/1
12.5 TABLET ORAL DAILY
Qty: 15 TABLET | Refills: 11 | Status: SHIPPED | OUTPATIENT
Start: 2025-05-30 | End: 2026-05-25

## 2025-05-30 RX ORDER — ATORVASTATIN CALCIUM 20 MG/1
20 TABLET, FILM COATED ORAL NIGHTLY
Qty: 30 TABLET | Refills: 11 | Status: SHIPPED | OUTPATIENT
Start: 2025-05-30

## 2025-05-30 ASSESSMENT — ACTIVITIES OF DAILY LIVING (ADL)
ADEQUATE_TO_COMPLETE_ADL: YES
WALKS IN HOME: INDEPENDENT
PILL BOX USED: NO
DOING HOUSEWORK: INDEPENDENT
GROCERY SHOPPING: NEEDS ASSISTANCE
HEARING - LEFT EAR: FUNCTIONAL
TOILETING: INDEPENDENT
EATING: INDEPENDENT
USING TRANSPORTATION: TOTAL CARE
NEEDS ASSISTANCE WITH FOOD: INDEPENDENT
MANAGING FINANCES: NEEDS ASSISTANCE
FEEDING YOURSELF: INDEPENDENT
STIL DRIVING: NO
DRESSING YOURSELF: INDEPENDENT
BATHING: INDEPENDENT
PREPARING MEALS: NEEDS ASSISTANCE
USING TELEPHONE: INDEPENDENT
JUDGMENT_ADEQUATE_SAFELY_COMPLETE_DAILY_ACTIVITIES: YES
GROOMING: INDEPENDENT
PATIENT'S MEMORY ADEQUATE TO SAFELY COMPLETE DAILY ACTIVITIES?: YES
HEARING - RIGHT EAR: FUNCTIONAL

## 2025-05-30 ASSESSMENT — ENCOUNTER SYMPTOMS
OCCASIONAL FEELINGS OF UNSTEADINESS: 0
NEUROLOGICAL NEGATIVE: 1
ARTHRALGIAS: 0
RESPIRATORY NEGATIVE: 1
GASTROINTESTINAL NEGATIVE: 1
CARDIOVASCULAR NEGATIVE: 1
LOSS OF SENSATION IN FEET: 0
WOUND: 0
CONSTITUTIONAL NEGATIVE: 1
DEPRESSION: 0

## 2025-05-30 ASSESSMENT — COLUMBIA-SUICIDE SEVERITY RATING SCALE - C-SSRS
1. IN THE PAST MONTH, HAVE YOU WISHED YOU WERE DEAD OR WISHED YOU COULD GO TO SLEEP AND NOT WAKE UP?: NO
2. HAVE YOU ACTUALLY HAD ANY THOUGHTS OF KILLING YOURSELF?: NO
6. HAVE YOU EVER DONE ANYTHING, STARTED TO DO ANYTHING, OR PREPARED TO DO ANYTHING TO END YOUR LIFE?: NO

## 2025-05-30 ASSESSMENT — PATIENT HEALTH QUESTIONNAIRE - PHQ9
SUM OF ALL RESPONSES TO PHQ9 QUESTIONS 1 AND 2: 0
1. LITTLE INTEREST OR PLEASURE IN DOING THINGS: NOT AT ALL
2. FEELING DOWN, DEPRESSED OR HOPELESS: NOT AT ALL

## 2025-05-30 NOTE — PROGRESS NOTES
Subjective   Reason for Visit: Sandee Sotelo is an 70 y.o. female here for a Medicare Wellness visit.     Past Medical, Surgical, and Family History reviewed and updated in chart.    Reviewed all medications by prescribing practitioner or clinical pharmacist (such as prescriptions, OTCs, herbal therapies and supplements) and documented in the medical record.    70-year-old female patient came year for wellness exam, I have seen her 3 years ago, she was originally had a pulmonary embolism, it must have caused the elevated pulmonary pressures because it was a significant burden of pulmonary embolism, afterwards that she was on anticoagulation and she was having massive GI bleed, she received 5 units of transfusion, she developed significant edema, she was in a acute rehab hospital, she was having duodenal ulcer although not active bleeding, I saw her once, afterwards she never followed, she lives by herself, she has the 2 brothers who is taking care of her, in month of November she was admitted because of anasarca, shortness of breath.  At that time coronary angiogram was done, mild diffuse disease was identified, at that time she has a echo done, severe pulmonary hypertension and right-sided heart failure were seen, ejection fraction on echocardiography was normal, patient was given SGLT2 inhibitors, Entresto and spironolactone, she did not follow-up with cardiology, she decided to come to me back, she never had a colon cancer screening, I do not see any mammogram, she has a CT angio, pleural effusions were seen, there was a thickening on one of the breast tissue, she was supposed to have a mammogram it was never done, today she came here for checkup and examination and wellness exam, she does not have a living will, she does not have all the vaccinations, she is sustaining as much as she can with limited activities and limited help.        Patient Care Team:  Ravi Horner MD as PCP - General  Ravi Horner MD as  "PCP - [employer] PCP  Damien Gomes MD as Cardiologist (Cardiology)     Review of Systems   Constitutional: Negative.    Respiratory: Negative.     Cardiovascular: Negative.    Gastrointestinal: Negative.    Musculoskeletal:  Negative for arthralgias.   Skin:  Negative for rash and wound.   Neurological: Negative.        Objective   Vitals:  /89 (BP Location: Left arm, Patient Position: Sitting, BP Cuff Size: Adult)   Pulse 106   Temp 36.2 °C (97.1 °F) (Temporal)   Ht 1.588 m (5' 2.5\")   Wt 78 kg (172 lb)   BMI 30.96 kg/m²       Physical Exam  Constitutional:       Appearance: Normal appearance. She is overweight.   HENT:      Head: Normocephalic.   Eyes:      Conjunctiva/sclera: Conjunctivae normal.   Cardiovascular:      Rate and Rhythm: Regular rhythm. Tachycardia present.      Heart sounds:      Gallop present.   Pulmonary:      Effort: Pulmonary effort is normal.      Breath sounds: Normal breath sounds.   Abdominal:      General: There is no distension.      Palpations: Abdomen is soft.      Tenderness: There is no abdominal tenderness.   Musculoskeletal:         General: No tenderness or deformity.      Cervical back: Neck supple.   Skin:     General: Skin is warm and dry.   Neurological:      General: No focal deficit present.      Mental Status: She is oriented to person, place, and time. Mental status is at baseline.   Psychiatric:         Mood and Affect: Mood normal.         Assessment & Plan  Chronic obstructive pulmonary disease with (acute) exacerbation (Multi)    Orders:    TSH with reflex to Free T4 if abnormal; Future    Colon cancer screening    Orders:    FIT (FOBT Immunoassay); Future    Routine general medical examination at health care facility    Orders:    1 Year Follow Up In Primary Care - Wellness Exam; Future    Encounter for screening mammogram for breast cancer    Orders:    BI mammo bilateral screening tomosynthesis; Future    Need for hepatitis C " screening test         Right heart failure due to pulmonary hypertension    Orders:    B-Type Natriuretic Peptide; Future    Comprehensive Metabolic Panel; Future    TSH with reflex to Free T4 if abnormal; Future    Special screening examination for viral disease    Orders:    Hepatitis C antibody; Future    Cardiomyopathy, unspecified type (Multi)    Orders:    B-Type Natriuretic Peptide; Future    CAD (coronary artery disease)    Orders:    atorvastatin (Lipitor) 20 mg tablet; Take 1 tablet (20 mg) by mouth once daily at bedtime.    New onset of congestive heart failure    Orders:    metoprolol succinate XL (Toprol-XL) 25 mg 24 hr tablet; Take 1 tablet (25 mg) by mouth once daily. Do not crush or chew.    sacubitriL-valsartan (Entresto) 24-26 mg tablet; Take 1 tablet by mouth 2 times a day.    spironolactone (Aldactone) 25 mg tablet; Take 0.5 tablets (12.5 mg) by mouth once daily.    torsemide (Demadex) 20 mg tablet; Take 1 tablet (20 mg) by mouth 3 times a week.  Wellness exam was done but not appear to be in a great shape, all the medications what she was supposed to be on has not been continued because she ran out, there was a issue with expense for example SGLT2 inhibitor is expensive she cannot afford.  She says that when she has a money she will get her Entresto refill, slightly tachycardic, I thought there was a gallop rhythm or S3, carefully reviewed all the cardiology report and Entresto will be continued at 1 tablet twice a day with beta-blocker spironolactone and also would check BNP, her BNP was seen in thousand range, would check her TSH, they said it was a Takotsubo syndrome, she does not have any obvious manifestation of florid congestive heart failure.  She needs mammogram, she needs a stool for fit test, she has a COPD, she quit smoking since last year, spiral CT of chest will serve as a low-dose CT scan of the lung for lung cancer screening, it is a right heart failure secondary to pulmonary  hypertension secondary to pulmonary embolism and significant shunting happen when she has a pulmonary hypertension, it is a cardiomyopathy although coronary artery disease is mild and diffuse.  New onset of congestive heart failure was seen at that time but seems to be under control, salt restriction is to be kept and maintain, fall precautions, appropriate help around, notification to physician if she gained significant amount of weight with periodic weight monitoring suggested, follow-up in 3 months.

## 2025-06-28 ENCOUNTER — PHARMACY VISIT (OUTPATIENT)
Dept: PHARMACY | Facility: CLINIC | Age: 70
End: 2025-06-28
Payer: COMMERCIAL

## 2025-06-28 PROCEDURE — RXMED WILLOW AMBULATORY MEDICATION CHARGE

## 2025-07-26 ENCOUNTER — PHARMACY VISIT (OUTPATIENT)
Dept: PHARMACY | Facility: CLINIC | Age: 70
End: 2025-07-26
Payer: COMMERCIAL

## 2025-07-26 PROCEDURE — RXMED WILLOW AMBULATORY MEDICATION CHARGE

## 2025-08-08 DIAGNOSIS — R73.9 HYPERGLYCEMIA: Primary | ICD-10-CM

## 2025-08-08 LAB
ALBUMIN SERPL-MCNC: 4.2 G/DL (ref 3.6–5.1)
ALP SERPL-CCNC: 66 U/L (ref 37–153)
ALT SERPL-CCNC: 11 U/L (ref 6–29)
ANION GAP SERPL CALCULATED.4IONS-SCNC: 11 MMOL/L (CALC) (ref 7–17)
AST SERPL-CCNC: 12 U/L (ref 10–35)
BILIRUB SERPL-MCNC: 0.5 MG/DL (ref 0.2–1.2)
BNP SERPL-MCNC: 34 PG/ML
BUN SERPL-MCNC: 21 MG/DL (ref 7–25)
CALCIUM SERPL-MCNC: 9.4 MG/DL (ref 8.6–10.4)
CHLORIDE SERPL-SCNC: 98 MMOL/L (ref 98–110)
CO2 SERPL-SCNC: 28 MMOL/L (ref 20–32)
CREAT SERPL-MCNC: 0.82 MG/DL (ref 0.6–1)
EGFRCR SERPLBLD CKD-EPI 2021: 77 ML/MIN/1.73M2
GLUCOSE SERPL-MCNC: 150 MG/DL (ref 65–139)
HCV AB SERPL QL IA: NORMAL
POTASSIUM SERPL-SCNC: 3.9 MMOL/L (ref 3.5–5.3)
PROT SERPL-MCNC: 7.2 G/DL (ref 6.1–8.1)
SODIUM SERPL-SCNC: 137 MMOL/L (ref 135–146)
TSH SERPL-ACNC: 1.66 MIU/L (ref 0.4–4.5)

## 2025-08-25 ENCOUNTER — PHARMACY VISIT (OUTPATIENT)
Dept: PHARMACY | Facility: CLINIC | Age: 70
End: 2025-08-25
Payer: COMMERCIAL

## 2025-08-25 ENCOUNTER — TELEPHONE (OUTPATIENT)
Dept: PRIMARY CARE | Facility: CLINIC | Age: 70
End: 2025-08-25
Payer: MEDICARE

## 2025-08-25 DIAGNOSIS — J44.1 CHRONIC OBSTRUCTIVE PULMONARY DISEASE WITH (ACUTE) EXACERBATION (MULTI): ICD-10-CM

## 2025-08-25 PROCEDURE — RXMED WILLOW AMBULATORY MEDICATION CHARGE

## 2025-08-25 RX ORDER — ALBUTEROL SULFATE 90 UG/1
2 INHALANT RESPIRATORY (INHALATION) EVERY 6 HOURS PRN
Qty: 18 G | Refills: 3 | Status: SHIPPED | OUTPATIENT
Start: 2025-08-25

## 2025-09-03 ENCOUNTER — APPOINTMENT (OUTPATIENT)
Dept: PRIMARY CARE | Facility: CLINIC | Age: 70
End: 2025-09-03
Payer: MEDICARE

## 2025-09-17 ENCOUNTER — APPOINTMENT (OUTPATIENT)
Dept: PRIMARY CARE | Facility: CLINIC | Age: 70
End: 2025-09-17
Payer: MEDICARE

## (undated) DEVICE — GUIDEWIRE, TIGERWIRE, FLOPPY, ANGLED, 150CM

## (undated) DEVICE — TUBING, MANIFOLD, LOW PRESSURE

## (undated) DEVICE — BAND, VASCULAR, RADIAL HEMOSTAT, REGULAR 24CM

## (undated) DEVICE — CATHETER, OPTITORQUE, 5FR, JACKY, 3.5/ 2H/110CM, CURVED

## (undated) DEVICE — SYRINGE, ANGIOGRAPHIC, MULTIDOSE

## (undated) DEVICE — SHEATH, GLIDESHEATH, SLENDER, 6FR 10CM

## (undated) DEVICE — GUIDEWIRE, J3 FIXED CORE, 0.035 IN X 260 CM, EXCHANGE